# Patient Record
Sex: FEMALE | Race: OTHER
[De-identification: names, ages, dates, MRNs, and addresses within clinical notes are randomized per-mention and may not be internally consistent; named-entity substitution may affect disease eponyms.]

---

## 2020-02-04 ENCOUNTER — HOSPITAL ENCOUNTER (INPATIENT)
Dept: HOSPITAL 54 - ER | Age: 85
LOS: 3 days | Discharge: NURSING, REHAB OR LONG-TERM CARE | DRG: 480 | End: 2020-02-07
Attending: NURSE PRACTITIONER | Admitting: NURSE PRACTITIONER
Payer: MEDICARE

## 2020-02-04 VITALS — WEIGHT: 136.25 LBS | BODY MASS INDEX: 25.72 KG/M2 | HEIGHT: 61 IN

## 2020-02-04 VITALS — DIASTOLIC BLOOD PRESSURE: 59 MMHG | SYSTOLIC BLOOD PRESSURE: 121 MMHG

## 2020-02-04 VITALS — DIASTOLIC BLOOD PRESSURE: 49 MMHG | SYSTOLIC BLOOD PRESSURE: 107 MMHG
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
## 2020-02-04 DIAGNOSIS — K21.9: ICD-10-CM

## 2020-02-04 DIAGNOSIS — M80.052A: Primary | ICD-10-CM

## 2020-02-04 DIAGNOSIS — E11.65: ICD-10-CM

## 2020-02-04 DIAGNOSIS — N18.9: ICD-10-CM

## 2020-02-04 DIAGNOSIS — E83.42: ICD-10-CM

## 2020-02-04 DIAGNOSIS — Z79.4: ICD-10-CM

## 2020-02-04 DIAGNOSIS — M10.9: ICD-10-CM

## 2020-02-04 DIAGNOSIS — E87.0: ICD-10-CM
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
## 2020-02-04 DIAGNOSIS — N17.0: ICD-10-CM

## 2020-02-04 DIAGNOSIS — E11.22: ICD-10-CM

## 2020-02-04 DIAGNOSIS — F03.90: ICD-10-CM

## 2020-02-04 DIAGNOSIS — I12.9: ICD-10-CM

## 2020-02-04 DIAGNOSIS — N39.0: ICD-10-CM

## 2020-02-04 DIAGNOSIS — B96.20: ICD-10-CM
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
## 2020-02-04 DIAGNOSIS — Z16.12: ICD-10-CM

## 2020-02-04 DIAGNOSIS — E03.9: ICD-10-CM

## 2020-02-04 LAB
ALBUMIN SERPL BCP-MCNC: 3 G/DL (ref 3.4–5)
ALP SERPL-CCNC: 144 U/L (ref 46–116)
ALT SERPL W P-5'-P-CCNC: 17 U/L (ref 12–78)
AST SERPL W P-5'-P-CCNC: 16 U/L (ref 15–37)
BASOPHILS # BLD AUTO: 0.1 /CMM (ref 0–0.2)
BASOPHILS NFR BLD AUTO: 0.7 % (ref 0–2)
BILIRUB DIRECT SERPL-MCNC: 0.1 MG/DL (ref 0–0.2)
BILIRUB SERPL-MCNC: 0.3 MG/DL (ref 0.2–1)
BUN SERPL-MCNC: 61 MG/DL (ref 7–18)
CALCIUM SERPL-MCNC: 9 MG/DL (ref 8.5–10.1)
CHLORIDE SERPL-SCNC: 111 MMOL/L (ref 98–107)
CO2 SERPL-SCNC: 27 MMOL/L (ref 21–32)
CREAT SERPL-MCNC: 2.8 MG/DL (ref 0.6–1.3)
EOSINOPHIL NFR BLD AUTO: 0.9 % (ref 0–6)
GLUCOSE SERPL-MCNC: 133 MG/DL (ref 74–106)
HCT VFR BLD AUTO: 37 % (ref 33–45)
HGB BLD-MCNC: 12.2 G/DL (ref 11.5–14.8)
LYMPHOCYTES NFR BLD AUTO: 1.6 /CMM (ref 0.8–4.8)
LYMPHOCYTES NFR BLD AUTO: 21.8 % (ref 20–44)
LYMPHOCYTES NFR BLD MANUAL: 19 % (ref 16–48)
MCHC RBC AUTO-ENTMCNC: 33 G/DL (ref 31–36)
MCV RBC AUTO: 93 FL (ref 82–100)
MONOCYTES NFR BLD AUTO: 0.7 /CMM (ref 0.1–1.3)
MONOCYTES NFR BLD AUTO: 8.7 % (ref 2–12)
MONOCYTES NFR BLD MANUAL: 6 % (ref 0–11)
NEUTROPHILS # BLD AUTO: 5.1 /CMM (ref 1.8–8.9)
NEUTROPHILS NFR BLD AUTO: 67.9 % (ref 43–81)
NEUTS SEG NFR BLD MANUAL: 75 % (ref 42–76)
PLATELET # BLD AUTO: 195 /CMM (ref 150–450)
POTASSIUM SERPL-SCNC: 4.1 MMOL/L (ref 3.5–5.1)
PROT SERPL-MCNC: 6.9 G/DL (ref 6.4–8.2)
RBC # BLD AUTO: 4.04 MIL/UL (ref 4–5.2)
SODIUM SERPL-SCNC: 146 MMOL/L (ref 136–145)
WBC NRBC COR # BLD AUTO: 7.5 K/UL (ref 4.3–11)

## 2020-02-04 PROCEDURE — A4216 STERILE WATER/SALINE, 10 ML: HCPCS

## 2020-02-04 PROCEDURE — A6253 ABSORPT DRG > 48 SQ IN W/O B: HCPCS

## 2020-02-04 PROCEDURE — C1713 ANCHOR/SCREW BN/BN,TIS/BN: HCPCS

## 2020-02-04 PROCEDURE — G0378 HOSPITAL OBSERVATION PER HR: HCPCS

## 2020-02-04 PROCEDURE — C9113 INJ PANTOPRAZOLE SODIUM, VIA: HCPCS

## 2020-02-04 PROCEDURE — A6209 FOAM DRSG <=16 SQ IN W/O BDR: HCPCS

## 2020-02-04 RX ADMIN — INSULIN GLARGINE SCH UNIT: 100 INJECTION, SOLUTION SUBCUTANEOUS at 21:44

## 2020-02-04 RX ADMIN — Medication PRN MG: at 15:54

## 2020-02-04 RX ADMIN — MEMANTINE HYDROCHLORIDE SCH MG: 5 TABLET ORAL at 18:14

## 2020-02-04 RX ADMIN — DONEPEZIL HYDROCHLORIDE SCH MG: 5 TABLET ORAL at 21:41

## 2020-02-04 RX ADMIN — Medication SCH MG: at 18:14

## 2020-02-04 RX ADMIN — SODIUM CHLORIDE PRN MLS/HR: 4.5 INJECTION, SOLUTION INTRAVENOUS at 15:48

## 2020-02-04 RX ADMIN — Medication SCH EACH: at 21:44

## 2020-02-04 RX ADMIN — INSULIN LISPRO SCH UNIT: 100 INJECTION, SOLUTION INTRAVENOUS; SUBCUTANEOUS at 18:16

## 2020-02-04 RX ADMIN — Medication PRN MG: at 23:56

## 2020-02-04 RX ADMIN — PREGABALIN SCH MG: 25 CAPSULE ORAL at 18:14

## 2020-02-04 RX ADMIN — Medication SCH EACH: at 18:14

## 2020-02-04 NOTE — NUR
RN NOTES

 BS-169MG/DL COVERAGE GIVEN, ADMINISTERED SCHEDULED MEDICATION. MEDICATION WERE ADMINISTERED 
FOR PAIN EFFECTIVE, PATIENTS DAUGHTER SIGN CONSENT FOR FOR SURGERY WHICH IS SCHEDULED 
TOMORROW 2/5/ 20, 1750 PM. V/S STABLE . PATIENT TOLERATED DINNER 30% WITH MINIMAL ASSIST. 
INFUSING 1/2 NS AT 75 ML/HR ON RIGHT AC ARE. CALL LIGHT WITHIN TO REACH, FAMILY NEXT TO THE 
BED. ENDORSED ONCOMING NURSE FOLLOW PLAN OF CARE.

## 2020-02-04 NOTE — NUR
RN OPEN NOTES



RECEIVED PATIENT AWAKE LYING IN BED. A/OX2. NO SIGNS OF DISTRESS OR DISCOMFORT. BREATHING 
EVEN AND UNLABORED. IV ACCESS IN RAC WITH 1/2 NS INFUSING, PATENT AND INTACT, NO SIGNS OF 
REDNESS OR INFILTRATION. BED IN LOW LOCKED POSITION WITH SIDE RAILS X2. CALL LIGHT WITHIN 
REACH. WILL CONTINUE TO MONITOR.

## 2020-02-04 NOTE — NUR
RN ADMISSION NOTES

 PATIENT WAS ADMITTED FROM ER 88 Y/OLD FEMALE  Macedonian SPEAKER ON Dx OF LEFT HIP Fx. PATIENT 
A/O X2, FORGETFUL, BUT REDIRECTABLE. PATIENT WAS COMPLAINING OF LEFT HIP PAIN 8/10 PER PAIN 
SCALE. NO ACUTE RESPIRATORY DISTRESS, BREATHING UNLABORED. V/S TAKEN /59, P-66, R-19, 
02-95 ROOM AIR, T-97.8.IV ACCESS ON RIGHT AC AREA INTACT, SKIN ASSESSMENT DONE CLEAR , HAS 
LEFT LEG  TOES AMPUTATION, AND RIGHT BIG TOE AMPUTATION. PATIENT INCONTINENT USING DIAPER, 
NEEDS ATTENDED  AND ANTICIPATED.  HOSPITALIST BETTY ABURTO AWARE OF NEW PATIENT AND 
MEDICATION, CALL LIGHT WITHIN TO REACH. CONTINUED MONITORING.

## 2020-02-04 NOTE — NUR
patient bibra from home, c/o L hip pain s/p fall from bed yesterday. On room 
air, breathing evenly and unlabored, connected to the monitor and pulse ox. 
kept comfortable, will continue to monitor accordingly.

## 2020-02-04 NOTE — NUR
rn notes

 administered morphine sulfate 2 mg/ml iv push for left hip pain 8/10 per patient request. 
v/s taken bp 121/59, p-66, r-19. call light within to reach, safety precaution maintained 
all the time.

## 2020-02-05 VITALS — DIASTOLIC BLOOD PRESSURE: 50 MMHG | SYSTOLIC BLOOD PRESSURE: 115 MMHG

## 2020-02-05 VITALS — SYSTOLIC BLOOD PRESSURE: 120 MMHG | DIASTOLIC BLOOD PRESSURE: 58 MMHG

## 2020-02-05 VITALS — DIASTOLIC BLOOD PRESSURE: 58 MMHG | SYSTOLIC BLOOD PRESSURE: 111 MMHG

## 2020-02-05 LAB
ALBUMIN SERPL BCP-MCNC: 2.6 G/DL (ref 3.4–5)
ALP SERPL-CCNC: 166 U/L (ref 46–116)
ALT SERPL W P-5'-P-CCNC: 37 U/L (ref 12–78)
APPEARANCE UR: (no result)
AST SERPL W P-5'-P-CCNC: 64 U/L (ref 15–37)
BASOPHILS # BLD AUTO: 0 /CMM (ref 0–0.2)
BASOPHILS NFR BLD AUTO: 0.7 % (ref 0–2)
BILIRUB SERPL-MCNC: 0.4 MG/DL (ref 0.2–1)
BILIRUB UR QL STRIP: NEGATIVE
BUN SERPL-MCNC: 58 MG/DL (ref 7–18)
CALCIUM SERPL-MCNC: 8.2 MG/DL (ref 8.5–10.1)
CHLORIDE SERPL-SCNC: 106 MMOL/L (ref 98–107)
CHOLEST SERPL-MCNC: 131 MG/DL (ref ?–200)
CHOLEST SERPL-MCNC: 132 MG/DL (ref ?–200)
CO2 SERPL-SCNC: 24 MMOL/L (ref 21–32)
COLOR UR: YELLOW
CREAT SERPL-MCNC: 2.6 MG/DL (ref 0.6–1.3)
CREAT UR-MCNC: 57.5 MG/DL (ref 30–125)
EOSINOPHIL NFR BLD AUTO: 1.4 % (ref 0–6)
FERRITIN SERPL-MCNC: 283 NG/ML (ref 8–388)
GLUCOSE SERPL-MCNC: 102 MG/DL (ref 74–106)
GLUCOSE UR STRIP-MCNC: NEGATIVE MG/DL
HCT VFR BLD AUTO: 33 % (ref 33–45)
HCT VFR BLD AUTO: 35 % (ref 33–45)
HDLC SERPL-MCNC: 58 MG/DL (ref 40–60)
HDLC SERPL-MCNC: 59 MG/DL (ref 40–60)
HGB BLD-MCNC: 10.8 G/DL (ref 11.5–14.8)
HGB BLD-MCNC: 11.2 G/DL (ref 11.5–14.8)
HGB UR QL STRIP: (no result) ERY/UL
IRON SERPL-MCNC: 31 UG/DL (ref 50–175)
KETONES UR STRIP-MCNC: NEGATIVE MG/DL
LDLC SERPL DIRECT ASSAY-MCNC: 54 MG/DL (ref 0–99)
LDLC SERPL DIRECT ASSAY-MCNC: 55 MG/DL (ref 0–99)
LEUKOCYTE ESTERASE UR QL STRIP: (no result)
LYMPHOCYTES NFR BLD AUTO: 1.6 /CMM (ref 0.8–4.8)
LYMPHOCYTES NFR BLD AUTO: 24.5 % (ref 20–44)
MAGNESIUM SERPL-MCNC: 1.4 MG/DL (ref 1.8–2.4)
MCHC RBC AUTO-ENTMCNC: 32 G/DL (ref 31–36)
MCV RBC AUTO: 92 FL (ref 82–100)
MONOCYTES NFR BLD AUTO: 0.6 /CMM (ref 0.1–1.3)
MONOCYTES NFR BLD AUTO: 9.3 % (ref 2–12)
NEUTROPHILS # BLD AUTO: 4.2 /CMM (ref 1.8–8.9)
NEUTROPHILS NFR BLD AUTO: 64.1 % (ref 43–81)
NITRITE UR QL STRIP: POSITIVE
PH UR STRIP: 6 [PH] (ref 5–8)
PHOSPHATE SERPL-MCNC: 4.1 MG/DL (ref 2.5–4.9)
PLATELET # BLD AUTO: 176 /CMM (ref 150–450)
POTASSIUM SERPL-SCNC: 4 MMOL/L (ref 3.5–5.1)
PROT SERPL-MCNC: 6.1 G/DL (ref 6.4–8.2)
PROT UR QL STRIP: NEGATIVE MG/DL
PROT UR-MCNC: 16.8 MG/DL (ref 0–11.9)
RBC # BLD AUTO: 3.77 MIL/UL (ref 4–5.2)
RBC #/AREA URNS HPF: (no result) /HPF (ref 0–2)
SODIUM SERPL-SCNC: 139 MMOL/L (ref 136–145)
SODIUM UR-SCNC: 51 MMOL/L (ref 40–220)
TIBC SERPL-MCNC: 220 UG/DL (ref 250–450)
TRIGL SERPL-MCNC: 90 MG/DL (ref 30–150)
TRIGL SERPL-MCNC: 95 MG/DL (ref 30–150)
TSH SERPL DL<=0.005 MIU/L-ACNC: 0.41 UIU/ML (ref 0.36–3.74)
UROBILINOGEN UR STRIP-MCNC: 0.2 EU/DL
WBC #/AREA URNS HPF: (no result) /HPF
WBC #/AREA URNS HPF: (no result) /HPF (ref 0–3)
WBC NRBC COR # BLD AUTO: 6.6 K/UL (ref 4.3–11)

## 2020-02-05 PROCEDURE — 0QS706Z REPOSITION LEFT UPPER FEMUR WITH INTRAMEDULLARY INTERNAL FIXATION DEVICE, OPEN APPROACH: ICD-10-PCS | Performed by: SPECIALIST

## 2020-02-05 RX ADMIN — INSULIN LISPRO SCH UNIT: 100 INJECTION, SOLUTION INTRAVENOUS; SUBCUTANEOUS at 17:30

## 2020-02-05 RX ADMIN — Medication PRN MG: at 10:33

## 2020-02-05 RX ADMIN — Medication SCH EACH: at 17:39

## 2020-02-05 RX ADMIN — Medication SCH MG: at 17:00

## 2020-02-05 RX ADMIN — SODIUM CHLORIDE SCH MG: 9 INJECTION, SOLUTION INTRAVENOUS at 08:44

## 2020-02-05 RX ADMIN — SODIUM CHLORIDE PRN MLS/HR: 4.5 INJECTION, SOLUTION INTRAVENOUS at 05:31

## 2020-02-05 RX ADMIN — INSULIN LISPRO SCH UNIT: 100 INJECTION, SOLUTION INTRAVENOUS; SUBCUTANEOUS at 07:30

## 2020-02-05 RX ADMIN — ALLOPURINOL SCH MG: 100 TABLET ORAL at 08:42

## 2020-02-05 RX ADMIN — MAGNESIUM SULFATE IN DEXTROSE SCH MLS/HR: 10 INJECTION, SOLUTION INTRAVENOUS at 10:33

## 2020-02-05 RX ADMIN — Medication SCH EACH: at 06:17

## 2020-02-05 RX ADMIN — MUPIROCIN SCH GM: 20 OINTMENT TOPICAL at 21:02

## 2020-02-05 RX ADMIN — Medication PRN MG: at 06:16

## 2020-02-05 RX ADMIN — MEMANTINE HYDROCHLORIDE SCH MG: 5 TABLET ORAL at 17:00

## 2020-02-05 RX ADMIN — Medication PRN MG: at 20:31

## 2020-02-05 RX ADMIN — AMLODIPINE BESYLATE SCH MG: 5 TABLET ORAL at 08:42

## 2020-02-05 RX ADMIN — DONEPEZIL HYDROCHLORIDE SCH MG: 5 TABLET ORAL at 21:03

## 2020-02-05 RX ADMIN — MEMANTINE HYDROCHLORIDE SCH MG: 5 TABLET ORAL at 08:41

## 2020-02-05 RX ADMIN — LEVOTHYROXINE SODIUM SCH MCG: 100 TABLET ORAL at 08:42

## 2020-02-05 RX ADMIN — MAGNESIUM SULFATE IN DEXTROSE SCH MLS/HR: 10 INJECTION, SOLUTION INTRAVENOUS at 11:35

## 2020-02-05 RX ADMIN — INSULIN LISPRO SCH UNIT: 100 INJECTION, SOLUTION INTRAVENOUS; SUBCUTANEOUS at 11:55

## 2020-02-05 RX ADMIN — PREGABALIN SCH MG: 25 CAPSULE ORAL at 08:41

## 2020-02-05 RX ADMIN — PREGABALIN SCH MG: 25 CAPSULE ORAL at 17:00

## 2020-02-05 RX ADMIN — FERROUS SULFATE TAB 325 MG (65 MG ELEMENTAL FE) SCH MG: 325 (65 FE) TAB at 08:42

## 2020-02-05 RX ADMIN — Medication SCH MG: at 08:42

## 2020-02-05 RX ADMIN — INSULIN GLARGINE SCH UNIT: 100 INJECTION, SOLUTION SUBCUTANEOUS at 21:01

## 2020-02-05 RX ADMIN — Medication SCH EACH: at 21:03

## 2020-02-05 RX ADMIN — Medication SCH EACH: at 11:46

## 2020-02-05 NOTE — NUR
MS RN OPENING NOTES





RECEIVED PT IN BED, AWAKE, A/O 2. PT TOLERATING RA, WITH NO ACUTE RESPIRATORY DISTRESS. PT 
DENIES ANY PAIN OR DISCOMFORT AT THIS TIME. ALSO DENIES ANY CONCERN OR QUESTIONS AT THE 
MOMENT. IVF 1/2 NS AT 75ML/HR CHANGED TO 200ML/HR PER DR MACHADO X2 BAGS TO RAC G20, INTACT 
AND FLUID INFUSING WELL. PT ON NPO EXCEPT MEDS AT THIS TIME, IN PREPARATION FOR 5PM SURGERY 
WITH DR. PORTER.  PT KEPT COMFORTABLE IN BED. CALL LIGHT KEPT WITHIN REACH. PT'S BED IN 
LOWEST, LOCKED POSITION WITH SR X3. WILL CONTINUE PLAN OF CARE.

## 2020-02-05 NOTE — NUR
MS RN NOTES



RECEIVED CALL FROM PHARMACY REGARDING MAGNESIUM LVL THAT THEY CANT REPLACE PER CREA OF 2.6, 
AGAINST THEIR [PROTOCOL. HOSPITALIST/TS MADE AWARE AND ORDERED 2GMS OF MG VIA IV. ORDER 
PLACED. PT MADE AWARE. WILL CONTINUE TO MONITOR.

## 2020-02-05 NOTE — NUR
RN CLOSING NOTES





PATIENT RESTING COMFORTABLY IN BED, EASILY AROUSABLE. A/OX2. NO SIGNS OF DISTRESS OR 
DISCOMFORT. BREATHING EVEN AND UNLABORED. IV ACCESS IN RAC WITH 1/2 NS INFUSING, PATENT AND 
INTACT, NO SIGNS OF REDNESS OR INFILTRATION. ALL NEEDS MET. NO SIGNIFICANT CHANGES THROUGH 
THE NIGHT. PATIENT KEPT CLEAN DRY AND COMFORTABLE. BED IN LOW LOCKED POSITION WITH SIDE 
RAILS X2. CALL LIGHT WITHIN REACH. WILL ENDORSE TO AM SHIFT FOR AFSHAN.

## 2020-02-06 VITALS — DIASTOLIC BLOOD PRESSURE: 57 MMHG | SYSTOLIC BLOOD PRESSURE: 105 MMHG

## 2020-02-06 VITALS — SYSTOLIC BLOOD PRESSURE: 117 MMHG | DIASTOLIC BLOOD PRESSURE: 45 MMHG

## 2020-02-06 VITALS — SYSTOLIC BLOOD PRESSURE: 115 MMHG | DIASTOLIC BLOOD PRESSURE: 55 MMHG

## 2020-02-06 LAB
ALBUMIN SERPL BCP-MCNC: 2.7 G/DL (ref 3.4–5)
ALP SERPL-CCNC: 212 U/L (ref 46–116)
ALT SERPL W P-5'-P-CCNC: 53 U/L (ref 12–78)
AST SERPL W P-5'-P-CCNC: 54 U/L (ref 15–37)
BASOPHILS # BLD AUTO: 0 /CMM (ref 0–0.2)
BASOPHILS NFR BLD AUTO: 0 % (ref 0–2)
BILIRUB SERPL-MCNC: 0.3 MG/DL (ref 0.2–1)
BUN SERPL-MCNC: 51 MG/DL (ref 7–18)
CALCIUM SERPL-MCNC: 8.6 MG/DL (ref 8.5–10.1)
CHLORIDE SERPL-SCNC: 99 MMOL/L (ref 98–107)
CK SERPL-CCNC: 58 U/L (ref 26–192)
CO2 SERPL-SCNC: 21 MMOL/L (ref 21–32)
CREAT SERPL-MCNC: 2.4 MG/DL (ref 0.6–1.3)
EOSINOPHIL NFR BLD AUTO: 0 % (ref 0–6)
GLUCOSE SERPL-MCNC: 409 MG/DL (ref 74–106)
HCT VFR BLD AUTO: 35 % (ref 33–45)
HGB BLD-MCNC: 11 G/DL (ref 11.5–14.8)
LYMPHOCYTES NFR BLD AUTO: 0.2 /CMM (ref 0.8–4.8)
LYMPHOCYTES NFR BLD AUTO: 2.2 % (ref 20–44)
MAGNESIUM SERPL-MCNC: 2 MG/DL (ref 1.8–2.4)
MCHC RBC AUTO-ENTMCNC: 32 G/DL (ref 31–36)
MCV RBC AUTO: 93 FL (ref 82–100)
MONOCYTES NFR BLD AUTO: 0.3 /CMM (ref 0.1–1.3)
MONOCYTES NFR BLD AUTO: 3.4 % (ref 2–12)
NEUTROPHILS # BLD AUTO: 8.8 /CMM (ref 1.8–8.9)
NEUTROPHILS NFR BLD AUTO: 94.4 % (ref 43–81)
PHOSPHATE SERPL-MCNC: 4.7 MG/DL (ref 2.5–4.9)
PLATELET # BLD AUTO: 164 /CMM (ref 150–450)
POTASSIUM SERPL-SCNC: 4.7 MMOL/L (ref 3.5–5.1)
PROT SERPL-MCNC: 6.6 G/DL (ref 6.4–8.2)
RBC # BLD AUTO: 3.73 MIL/UL (ref 4–5.2)
SODIUM SERPL-SCNC: 132 MMOL/L (ref 136–145)
WBC NRBC COR # BLD AUTO: 9.4 K/UL (ref 4.3–11)

## 2020-02-06 RX ADMIN — INSULIN LISPRO SCH UNIT: 100 INJECTION, SOLUTION INTRAVENOUS; SUBCUTANEOUS at 07:30

## 2020-02-06 RX ADMIN — AMLODIPINE BESYLATE SCH MG: 5 TABLET ORAL at 08:41

## 2020-02-06 RX ADMIN — MUPIROCIN SCH APPLIC: 20 OINTMENT TOPICAL at 21:36

## 2020-02-06 RX ADMIN — PREGABALIN SCH MG: 25 CAPSULE ORAL at 16:15

## 2020-02-06 RX ADMIN — MEMANTINE HYDROCHLORIDE SCH MG: 5 TABLET ORAL at 16:15

## 2020-02-06 RX ADMIN — INSULIN HUMAN PRN UNIT: 100 INJECTION, SOLUTION PARENTERAL at 08:02

## 2020-02-06 RX ADMIN — Medication SCH EACH: at 12:32

## 2020-02-06 RX ADMIN — Medication PRN EACH: at 02:53

## 2020-02-06 RX ADMIN — DEXTROSE MONOHYDRATE SCH MLS/HR: 50 INJECTION, SOLUTION INTRAVENOUS at 01:15

## 2020-02-06 RX ADMIN — INSULIN LISPRO SCH UNIT: 100 INJECTION, SOLUTION INTRAVENOUS; SUBCUTANEOUS at 13:15

## 2020-02-06 RX ADMIN — SODIUM CHLORIDE SCH MG: 9 INJECTION, SOLUTION INTRAVENOUS at 08:15

## 2020-02-06 RX ADMIN — Medication SCH EACH: at 05:50

## 2020-02-06 RX ADMIN — Medication SCH MG: at 15:32

## 2020-02-06 RX ADMIN — MUPIROCIN SCH APPLIC: 20 OINTMENT TOPICAL at 08:41

## 2020-02-06 RX ADMIN — INSULIN GLARGINE SCH UNIT: 100 INJECTION, SOLUTION SUBCUTANEOUS at 21:49

## 2020-02-06 RX ADMIN — ALLOPURINOL SCH MG: 100 TABLET ORAL at 08:16

## 2020-02-06 RX ADMIN — Medication SCH EACH: at 21:35

## 2020-02-06 RX ADMIN — Medication SCH MG: at 21:35

## 2020-02-06 RX ADMIN — MEMANTINE HYDROCHLORIDE SCH MG: 5 TABLET ORAL at 08:16

## 2020-02-06 RX ADMIN — Medication SCH MG: at 16:18

## 2020-02-06 RX ADMIN — DEXTROSE MONOHYDRATE SCH MLS/HR: 50 INJECTION, SOLUTION INTRAVENOUS at 01:18

## 2020-02-06 RX ADMIN — Medication SCH MG: at 08:16

## 2020-02-06 RX ADMIN — Medication SCH EACH: at 18:04

## 2020-02-06 RX ADMIN — PREGABALIN SCH MG: 25 CAPSULE ORAL at 08:16

## 2020-02-06 RX ADMIN — DONEPEZIL HYDROCHLORIDE SCH MG: 5 TABLET ORAL at 21:35

## 2020-02-06 RX ADMIN — LEVOTHYROXINE SODIUM SCH MCG: 100 TABLET ORAL at 08:16

## 2020-02-06 RX ADMIN — Medication PRN EACH: at 13:35

## 2020-02-06 RX ADMIN — FERROUS SULFATE TAB 325 MG (65 MG ELEMENTAL FE) SCH MG: 325 (65 FE) TAB at 08:16

## 2020-02-06 RX ADMIN — INSULIN LISPRO SCH UNIT: 100 INJECTION, SOLUTION INTRAVENOUS; SUBCUTANEOUS at 18:15

## 2020-02-06 NOTE — NUR
RN OPENING NOTES



RECEIVED PATIENT IN STABLE CONDITION. AWAKE AND RESPONSIVE TO VERBAL AND TACTILE STIMULI. 
A/OX2, French SPEAKING. NOT IN ANY FORM OF DISTRESS. NO SOB. DENIED PAIN OR DISCOMFORT AT 
THIS TIME. IV ACCESS INTACT AND PATENT. MAYER IN PLACE DRAINING CLEAR YELLOW URINE. KEPT 
PATIENT SAFE AND COMFORTABLE. SCD PUMPS IN PLACE. BED IN LOW/LOCKED POSITION, SIDERAILS 
UPX2, CALL LIGHT IN REACH. WILL CONT TO MONIOTR ACCORDINGLY.

## 2020-02-06 NOTE — NUR
RN CLOSING NOTES



PATIENT IN STABLE CONDITION. ALL NEEDS ATTENDED AND PROVIDED. ALL DUE MEDS GIVEN AS ORDERED. 
TURNED AND REPOSITIONED PATIENT EVERY 2HRS AND AS NEEDED. KEPT PATIENT SAFE AND COMFORTABLE. 
BED IN LOW,LOCKED POSITION. SIDERAILS UPX2, SEMIFOWLER POSITION. CALL LIGHT IN REACH. 
ENDORSED ACCORDINGLY

## 2020-02-06 NOTE — NUR
RN NOTES



HS ACCUCHECK 179. ONLY ADMINISTERED LANTUS 8UN VIA LT DELTOID. PER FAMILY REQUEST, SON 
STATED THAT Pt's BLOOD SUGAR TENDS TO DROP LOW IN THE MORNING. SO DID NOT GIVE ADDITIONAL 
INSULIN COVERAGE AT THIS TIME. WILL REASSESS BG IN THE AM.

## 2020-02-06 NOTE — NUR
MS RN NOTES



AWAKE & RESPONSIVE. NOT IN ANY DISTRESS. NO SOB NOTED. DENIES ANY PAIN OR DISCOMFORT AT THIS 
TIME. WITH IVF INFUSING WELL. WITH F/C DRAINING TO YELLOWISH OUTPUT MODERATE IN AMOUNT. 
DRESSING @ L HIP C/D/I. AM CARE DONE. MONITORED ACCORDINGY. CALL LIGHT WITHIN REACH. BED IN 
LOWEST POSITION. SR UP X 3 WITH BED ALARM ON FOR SAFETY. WILL ENDORSE TO NEXT SHIFT.

## 2020-02-06 NOTE — NUR
RN OPENING NOTES



RECEIVED REPORT FROM MEG DINH. FOUND Pt AWAKE, RESTING IN BED, FAMILY VISITING AT 
BEDSIDE. NO S/S OF ACUTE DISTRESS OR SOB NOTED. 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 
SPEAKING ONLY. MAYER CATHETER IN PLACE, WILL REMOVE LATER TONIGHT PER ORDER TO REMOVE MAYER 
POST OP DAY 1. IV ACCESS ON RAC #20G. Pt IS ON ISO FOR MRSA NARES. PER REPORT Pt's TEMP WAS 
RUNNING LOW, SO BEAR HUGGER WAS PLACED ON Pt TO HELP INCREASE HER BODY TEMP. PER Pt 
STATEMENT, Pt DOES NOT FEEL COLD. CURRENT TEMP IS 97.5F. WILL CONTINUE TO MONITOR Pt's 
CONDITION AND SAFETY THROUGHOUT THE NIGHT.

## 2020-02-06 NOTE — NUR
rn notes



called Leonard, pharmacist, regarding insulin scheduled at 0730, informed Leonard that 10 units 
regular insulin ACHS was given. per Leonard, hold the lispro for now, and verify with MD if 
both "0730 scheduled insulin" or "ACHS" in AM is ok to continue or only one of the two is 
needed.

  

Scheduled 5 units lispro held. regular 10 units insulin given already for bs 409. will 
notify MD

## 2020-02-07 ENCOUNTER — HOSPITAL ENCOUNTER (INPATIENT)
Dept: HOSPITAL 12 - REHAB | Age: 85
LOS: 18 days | Discharge: HOME HEALTH | DRG: 559 | End: 2020-02-25
Attending: PHYSICAL MEDICINE & REHABILITATION | Admitting: PHYSICAL MEDICINE & REHABILITATION
Payer: MEDICARE

## 2020-02-07 VITALS — BODY MASS INDEX: 25.52 KG/M2 | WEIGHT: 130 LBS | HEIGHT: 60 IN

## 2020-02-07 VITALS — DIASTOLIC BLOOD PRESSURE: 42 MMHG | SYSTOLIC BLOOD PRESSURE: 119 MMHG

## 2020-02-07 VITALS — DIASTOLIC BLOOD PRESSURE: 61 MMHG | SYSTOLIC BLOOD PRESSURE: 125 MMHG

## 2020-02-07 VITALS — SYSTOLIC BLOOD PRESSURE: 133 MMHG | DIASTOLIC BLOOD PRESSURE: 54 MMHG

## 2020-02-07 VITALS — DIASTOLIC BLOOD PRESSURE: 62 MMHG | SYSTOLIC BLOOD PRESSURE: 119 MMHG

## 2020-02-07 DIAGNOSIS — G62.9: ICD-10-CM

## 2020-02-07 DIAGNOSIS — D62: ICD-10-CM

## 2020-02-07 DIAGNOSIS — D68.59: ICD-10-CM

## 2020-02-07 DIAGNOSIS — Z79.4: ICD-10-CM

## 2020-02-07 DIAGNOSIS — F03.90: ICD-10-CM

## 2020-02-07 DIAGNOSIS — M10.9: ICD-10-CM

## 2020-02-07 DIAGNOSIS — N39.0: ICD-10-CM

## 2020-02-07 DIAGNOSIS — N18.9: ICD-10-CM

## 2020-02-07 DIAGNOSIS — I50.33: ICD-10-CM

## 2020-02-07 DIAGNOSIS — M19.90: ICD-10-CM
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
## 2020-02-07 DIAGNOSIS — I13.0: ICD-10-CM

## 2020-02-07 DIAGNOSIS — E11.65: ICD-10-CM

## 2020-02-07 DIAGNOSIS — D63.1: ICD-10-CM

## 2020-02-07 DIAGNOSIS — N17.0: ICD-10-CM

## 2020-02-07 DIAGNOSIS — E03.9: ICD-10-CM

## 2020-02-07 DIAGNOSIS — I27.20: ICD-10-CM

## 2020-02-07 DIAGNOSIS — E11.22: ICD-10-CM

## 2020-02-07 DIAGNOSIS — Z16.12: ICD-10-CM

## 2020-02-07 DIAGNOSIS — I35.8: ICD-10-CM

## 2020-02-07 DIAGNOSIS — R53.83: ICD-10-CM

## 2020-02-07 DIAGNOSIS — M81.0: ICD-10-CM

## 2020-02-07 DIAGNOSIS — G93.41: ICD-10-CM

## 2020-02-07 DIAGNOSIS — K21.9: ICD-10-CM

## 2020-02-07 DIAGNOSIS — S72.142D: Primary | ICD-10-CM

## 2020-02-07 LAB
ALBUMIN SERPL ELPH-MCNC: 3 G/DL (ref 2.9–4.4)
ALBUMIN/GLOB SERPL: 0.9 {RATIO} (ref 0.7–1.7)
ALPHA1 GLOB SERPL ELPH-MCNC: 0.4 G/DL (ref 0–0.4)
ALPHA2 GLOB SERPL ELPH-MCNC: 0.9 G/DL (ref 0.4–1)
B-GLOBULIN SERPL ELPH-MCNC: 1 G/DL (ref 0.7–1.3)
BASOPHILS # BLD AUTO: 0 /CMM (ref 0–0.2)
BASOPHILS NFR BLD AUTO: 0.3 % (ref 0–2)
BUN SERPL-MCNC: 52 MG/DL (ref 7–18)
CALCIUM SERPL-MCNC: 8.6 MG/DL (ref 8.5–10.1)
CHLORIDE SERPL-SCNC: 99 MMOL/L (ref 98–107)
CO2 SERPL-SCNC: 22 MMOL/L (ref 21–32)
CREAT SERPL-MCNC: 2.4 MG/DL (ref 0.6–1.3)
EOSINOPHIL NFR BLD AUTO: 0.3 % (ref 0–6)
GAMMA GLOB SERPL ELPH-MCNC: 1.1 G/DL (ref 0.4–1.8)
GLOBULIN SER CALC-MCNC: 3.4 G/DL (ref 2.2–3.9)
GLUCOSE SERPL-MCNC: 150 MG/DL (ref 74–106)
HCT VFR BLD AUTO: 29 % (ref 33–45)
HGB BLD-MCNC: 9.4 G/DL (ref 11.5–14.8)
LYMPHOCYTES NFR BLD AUTO: 0.9 /CMM (ref 0.8–4.8)
LYMPHOCYTES NFR BLD AUTO: 9.6 % (ref 20–44)
MAGNESIUM SERPL-MCNC: 2 MG/DL (ref 1.8–2.4)
MCHC RBC AUTO-ENTMCNC: 33 G/DL (ref 31–36)
MCV RBC AUTO: 92 FL (ref 82–100)
MONOCYTES NFR BLD AUTO: 0.8 /CMM (ref 0.1–1.3)
MONOCYTES NFR BLD AUTO: 8 % (ref 2–12)
NEUTROPHILS # BLD AUTO: 7.9 /CMM (ref 1.8–8.9)
NEUTROPHILS NFR BLD AUTO: 81.8 % (ref 43–81)
PHOSPHATE SERPL-MCNC: 3.7 MG/DL (ref 2.5–4.9)
PLATELET # BLD AUTO: 139 /CMM (ref 150–450)
POTASSIUM SERPL-SCNC: 4.7 MMOL/L (ref 3.5–5.1)
PTH-INTACT SERPL-MCNC: 117 PG/ML (ref 15–65)
RBC # BLD AUTO: 3.14 MIL/UL (ref 4–5.2)
SODIUM SERPL-SCNC: 131 MMOL/L (ref 136–145)
WBC NRBC COR # BLD AUTO: 9.6 K/UL (ref 4.3–11)

## 2020-02-07 PROCEDURE — C1758 CATHETER, URETERAL: HCPCS

## 2020-02-07 PROCEDURE — A9150 MISC/EXPER NON-PRESCRIPT DRU: HCPCS

## 2020-02-07 RX ADMIN — Medication PRN EACH: at 16:04

## 2020-02-07 RX ADMIN — MEMANTINE HYDROCHLORIDE SCH MG: 5 TABLET ORAL at 16:04

## 2020-02-07 RX ADMIN — Medication SCH EACH: at 12:19

## 2020-02-07 RX ADMIN — SODIUM CHLORIDE PRN UNIT: 9 INJECTION, SOLUTION INTRAVENOUS at 21:42

## 2020-02-07 RX ADMIN — INSULIN LISPRO SCH UNIT: 100 INJECTION, SOLUTION INTRAVENOUS; SUBCUTANEOUS at 08:05

## 2020-02-07 RX ADMIN — LEVOTHYROXINE SODIUM SCH MCG: 100 TABLET ORAL at 07:57

## 2020-02-07 RX ADMIN — SODIUM CHLORIDE SCH MG: 9 INJECTION, SOLUTION INTRAVENOUS at 08:09

## 2020-02-07 RX ADMIN — MUPIROCIN SCH APPLIC: 20 OINTMENT TOPICAL at 08:10

## 2020-02-07 RX ADMIN — PREGABALIN SCH MG: 25 CAPSULE ORAL at 16:04

## 2020-02-07 RX ADMIN — Medication SCH MG: at 16:04

## 2020-02-07 RX ADMIN — MEMANTINE HYDROCHLORIDE SCH MG: 5 TABLET ORAL at 21:28

## 2020-02-07 RX ADMIN — Medication SCH EACH: at 21:26

## 2020-02-07 RX ADMIN — Medication SCH MG: at 08:08

## 2020-02-07 RX ADMIN — DOCUSATE SODIUM SCH MG: 100 CAPSULE, LIQUID FILLED ORAL at 18:30

## 2020-02-07 RX ADMIN — Medication PRN EACH: at 10:00

## 2020-02-07 RX ADMIN — FERROUS SULFATE TAB 325 MG (65 MG ELEMENTAL FE) SCH MG: 325 (65 FE) TAB at 08:08

## 2020-02-07 RX ADMIN — Medication SCH MG: at 04:17

## 2020-02-07 RX ADMIN — ALLOPURINOL SCH MG: 100 TABLET ORAL at 08:08

## 2020-02-07 RX ADMIN — INSULIN GLARGINE SCH UNITS: 100 INJECTION, SOLUTION SUBCUTANEOUS at 21:40

## 2020-02-07 RX ADMIN — AMLODIPINE BESYLATE SCH MG: 5 TABLET ORAL at 08:09

## 2020-02-07 RX ADMIN — Medication SCH EACH: at 06:32

## 2020-02-07 RX ADMIN — INSULIN LISPRO SCH UNIT: 100 INJECTION, SOLUTION INTRAVENOUS; SUBCUTANEOUS at 12:27

## 2020-02-07 RX ADMIN — MEMANTINE HYDROCHLORIDE SCH MG: 5 TABLET ORAL at 08:09

## 2020-02-07 RX ADMIN — INSULIN HUMAN PRN UNIT: 100 INJECTION, SOLUTION PARENTERAL at 06:53

## 2020-02-07 RX ADMIN — PREGABALIN SCH MG: 25 CAPSULE ORAL at 08:08

## 2020-02-07 NOTE — NUR
RN CLOSING NOTES



NO SIGNIFICANT CHANGES IN Pt's CONDITION. NO S/S OF ACUTE DISTRESS OR SOB NOTED DURING THE 
NIGHT. ALL NEEDS MET AND ATTENDED TO. SAFETY MEASURES IN PLACE. ENDORSED TO DAYSHIFT RN FOR 
Pt's AFSHAN.

## 2020-02-07 NOTE — NUR
M/S RN NOTES



PATIENT AWAKE IN BED, NO RESPIRATORY DISTRESS, NO C/O PAIN AT THIS TIME. SKIN WARM TO TOUCH, 
IV ON THE LAC #24G, INTACT AND PATENT, LR INFUSING AT 75ML/HR. PATIENT'S DRESSING ON THE 
LEFT HIP C/D/I. PATIENT'S NEEDS ATTENDED, BED ON LOWEST LOCKED POSITION, CALL LIGHT WITHIN 
REACH. WILL CONTINUE TO MONITOR.

## 2020-02-07 NOTE — NUR
M/S RN NOTES



PATIENT DISCHARGED IN STABLE CONDITION, VSS, NO RESPIRATORY DISTRESS, PAIN TOLERABLE AT 
LEVEL 4/10 WITH PAIN MEDICATION AS PRESCRIBED. FAMILY AT BEDSIDE, GIVEN DISCHARGE 
INSTRUCTIONS TO FAMILY AND PATIENT,VERBALIZED UNDERSTANDING. SKIN WARM TO TOUCH, IV ACCESS 
SITE LEAKING, REMOVED AND APPLIED PRESSURE DRESSING. PATIENT'S SKIN ASSESSED, CLEANED 
SURGICAL SITE WITH IODINE, DRESSING CHANGED, COVERED WITH NEW DRESSING AND TAPE. MD AWARE OF 
DRESSING CHANGE. PHOTOS TAKEN AND PUT IN CHART. PATIENT HAS NO BELONGINGS, BELONGINGS LIST 
SIGNED. PATIENT'S NEEDS ATTENDED. REPORT GIVEN TO PARAMEDIC. PATIENT LEFT WITH PARAMEDICS 
VIA GURNEY. REPORT GIVEN TO KHLOE AT [facility].

## 2020-02-07 NOTE — NUR
VSS 98.0-62-17  /42.  SATS 100% ON O2 @ 2LITERS NC.  PATIENT NEWLY ADMITTED APPROX. 3 
HOURS AGO.  COLOR SLIGHTLY PALE BUT FAIR.  SKIN WARM, DRY, & INTACT.  NO BEDSORES OR 
BREAKDOWNS.  ABDOMEN SOFTLY DISTENDED WITH +BOWEL SOUNDS.  NO MAYER FOR INC OF BOWEL AND 
BLADDER.  1+ ANKLE EDEMAS WITH ALL PERIPHERAL PULSES+.  NO SIGNS OF ACUTE 
CARDIAC/RESPIRATORY DISTRESS OR SUPPRESSION.  NO OBVIOUS SIGNS OF PAIN.

## 2020-02-07 NOTE — NUR
Patient received from [facility]. Report was received from OTTO Lewis. Pt is 
oriented to self only. Pt is cooperative. Pt is on MRSA isolation. The family is present at 
the bed side. Pt is bed bound, unable to move in bed independently. Skin assessment is done, 
pt has left hip incisions under dressing. Pt amputations of left foot toes. history obtained 
from the family.

## 2020-02-08 VITALS — DIASTOLIC BLOOD PRESSURE: 51 MMHG | SYSTOLIC BLOOD PRESSURE: 128 MMHG

## 2020-02-08 VITALS — SYSTOLIC BLOOD PRESSURE: 115 MMHG | DIASTOLIC BLOOD PRESSURE: 42 MMHG

## 2020-02-08 VITALS — SYSTOLIC BLOOD PRESSURE: 116 MMHG | DIASTOLIC BLOOD PRESSURE: 48 MMHG

## 2020-02-08 VITALS — DIASTOLIC BLOOD PRESSURE: 56 MMHG | SYSTOLIC BLOOD PRESSURE: 135 MMHG

## 2020-02-08 RX ADMIN — INSULIN GLARGINE SCH UNITS: 100 INJECTION, SOLUTION SUBCUTANEOUS at 20:59

## 2020-02-08 RX ADMIN — Medication SCH EACH: at 12:44

## 2020-02-08 RX ADMIN — DOCUSATE SODIUM SCH MG: 100 CAPSULE, LIQUID FILLED ORAL at 16:43

## 2020-02-08 RX ADMIN — ALLOPURINOL SCH MG: 100 TABLET ORAL at 09:05

## 2020-02-08 RX ADMIN — MEMANTINE HYDROCHLORIDE SCH MG: 5 TABLET ORAL at 09:05

## 2020-02-08 RX ADMIN — PREGABALIN SCH MG: 25 CAPSULE ORAL at 09:05

## 2020-02-08 RX ADMIN — SODIUM CHLORIDE PRN UNIT: 9 INJECTION, SOLUTION INTRAVENOUS at 12:47

## 2020-02-08 RX ADMIN — SODIUM CHLORIDE SCH MLS/HR: 9 INJECTION, SOLUTION INTRAVENOUS at 00:10

## 2020-02-08 RX ADMIN — PANTOPRAZOLE SODIUM SCH MG: 40 TABLET, DELAYED RELEASE ORAL at 06:56

## 2020-02-08 RX ADMIN — Medication SCH EACH: at 07:05

## 2020-02-08 RX ADMIN — SODIUM CHLORIDE SCH MLS/HR: 9 INJECTION, SOLUTION INTRAVENOUS at 12:40

## 2020-02-08 RX ADMIN — DONEPEZIL HYDROCHLORIDE SCH MG: 10 TABLET, FILM COATED ORAL at 09:04

## 2020-02-08 RX ADMIN — AMLODIPINE BESYLATE SCH MG: 5 TABLET ORAL at 09:06

## 2020-02-08 RX ADMIN — LEVOTHYROXINE SODIUM SCH MCG: 100 TABLET ORAL at 06:56

## 2020-02-08 RX ADMIN — SODIUM CHLORIDE SCH MLS/HR: 9 INJECTION, SOLUTION INTRAVENOUS at 23:07

## 2020-02-08 RX ADMIN — ENOXAPARIN SODIUM SCH MG: 30 INJECTION SUBCUTANEOUS at 21:02

## 2020-02-08 RX ADMIN — MUPIROCIN SCH GM: 20 OINTMENT TOPICAL at 20:48

## 2020-02-08 RX ADMIN — Medication SCH EACH: at 20:55

## 2020-02-08 RX ADMIN — DOCUSATE SODIUM SCH MG: 100 CAPSULE, LIQUID FILLED ORAL at 09:05

## 2020-02-08 RX ADMIN — Medication SCH EACH: at 16:38

## 2020-02-08 RX ADMIN — MEMANTINE HYDROCHLORIDE SCH MG: 5 TABLET ORAL at 20:48

## 2020-02-08 RX ADMIN — SODIUM CHLORIDE PRN UNIT: 9 INJECTION, SOLUTION INTRAVENOUS at 16:41

## 2020-02-08 NOTE — NUR
NURSE PLACED #22 ANGIO TO RIGHT HAND.  ACCUCHECK AT BEDTIME=173 MG/DL--COVERED WITH 3 UNITS 
OF REGULAR INSULIN & ROUTINE LANTUS 8 UNITS.  NURSE SCANNED BLADDER RANDOMLY AND FOUND 365 
CC IN BLADDER.  WILL WATCH PATIENTS OUTPUT

## 2020-02-08 NOTE — NUR
NSG: RECEIVED PT'S  LYING ON BED. 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 
URINE. CONTINUE ON MEROPENEM IV FOR ESBL URINE. NO ADVERSE REACTION NOTED.  NO HEMATURIA , 
BUT NOTED URINE WITH FOUL SMELL.  BS CHECKED AND LANTUS SUB-Q GIVEN AS ORDERED.BLOOD SUGAR 
111MG/DL. PATIENT TURNED AND REPOSITIONED .KEPT CLEAN AND DRY.

## 2020-02-08 NOTE — NUR
PT ON BED WITH EYES CLOSED, EASILY AROUSABLE WHEN AWAKEN, NOT IN RESPIRATORY DISTRESS, 
RECEIVED WITH 02 AT 2L/NC FOR COMFORT BREATHING.Hungarian SPEAKING ONLY WHEN SPOKEN TO. NO C/P 
PAIN WHEN ASKED, WITH HEPLOCK ON THE RIGHT HAND WITH GAUGE 22, NO SS OF INFILTRATION NOTED 
ON THE AREA.

## 2020-02-08 NOTE — NUR
Received an order from Lucia Hawthorne NP to continue Bactroban ointment apply to nostrils 
Q12 hrs x 5 days for MRSA of nares.

## 2020-02-08 NOTE — NUR
PT'S  ON CONTACT ISOLATION FOR MRSA OF BOTH NARES AND ESBL OF URINE. CONTINUE ON MEROPENEM 
IV FOR ESBL URINE. NO ADVERSE REACTION NOTED. AFEBRILE. NO HEMATURIA , BUT NOTED URINE WITH 
FOUL SMELL.  BS CHECKED NOSS OF HYPERGLYCEMIA NOTED. PT DOESN'T CALL FOR HELP, NEEDS 
ANTICIPATED. TURNED AND REPOSITIONED .KEPT CLEAN AND DRY.

## 2020-02-09 VITALS — DIASTOLIC BLOOD PRESSURE: 45 MMHG | SYSTOLIC BLOOD PRESSURE: 117 MMHG

## 2020-02-09 VITALS — DIASTOLIC BLOOD PRESSURE: 38 MMHG | SYSTOLIC BLOOD PRESSURE: 102 MMHG

## 2020-02-09 VITALS — SYSTOLIC BLOOD PRESSURE: 147 MMHG | DIASTOLIC BLOOD PRESSURE: 56 MMHG

## 2020-02-09 VITALS — DIASTOLIC BLOOD PRESSURE: 52 MMHG | SYSTOLIC BLOOD PRESSURE: 135 MMHG

## 2020-02-09 VITALS — DIASTOLIC BLOOD PRESSURE: 54 MMHG | SYSTOLIC BLOOD PRESSURE: 140 MMHG

## 2020-02-09 RX ADMIN — LEVOTHYROXINE SODIUM SCH MCG: 100 TABLET ORAL at 06:07

## 2020-02-09 RX ADMIN — AMLODIPINE BESYLATE SCH MG: 5 TABLET ORAL at 08:42

## 2020-02-09 RX ADMIN — Medication SCH EACH: at 11:39

## 2020-02-09 RX ADMIN — SODIUM CHLORIDE PRN UNIT: 9 INJECTION, SOLUTION INTRAVENOUS at 21:43

## 2020-02-09 RX ADMIN — DOCUSATE SODIUM SCH MG: 100 CAPSULE, LIQUID FILLED ORAL at 16:27

## 2020-02-09 RX ADMIN — HYDROCODONE BITARTRATE AND ACETAMINOPHEN PRN TAB: 5; 325 TABLET ORAL at 12:21

## 2020-02-09 RX ADMIN — INSULIN GLARGINE SCH UNITS: 100 INJECTION, SOLUTION SUBCUTANEOUS at 21:44

## 2020-02-09 RX ADMIN — DOCUSATE SODIUM SCH MG: 100 CAPSULE, LIQUID FILLED ORAL at 08:26

## 2020-02-09 RX ADMIN — HYDROCODONE BITARTRATE AND ACETAMINOPHEN PRN TAB: 5; 325 TABLET ORAL at 11:13

## 2020-02-09 RX ADMIN — Medication SCH EACH: at 16:41

## 2020-02-09 RX ADMIN — HYDROCODONE BITARTRATE AND ACETAMINOPHEN PRN TAB: 5; 325 TABLET ORAL at 06:29

## 2020-02-09 RX ADMIN — MUPIROCIN SCH GM: 20 OINTMENT TOPICAL at 21:31

## 2020-02-09 RX ADMIN — MUPIROCIN SCH GM: 20 OINTMENT TOPICAL at 08:22

## 2020-02-09 RX ADMIN — MEMANTINE HYDROCHLORIDE SCH MG: 5 TABLET ORAL at 08:23

## 2020-02-09 RX ADMIN — MEMANTINE HYDROCHLORIDE SCH MG: 5 TABLET ORAL at 21:31

## 2020-02-09 RX ADMIN — Medication SCH EACH: at 21:38

## 2020-02-09 RX ADMIN — SODIUM CHLORIDE SCH MLS/HR: 9 INJECTION, SOLUTION INTRAVENOUS at 11:33

## 2020-02-09 RX ADMIN — SODIUM CHLORIDE PRN UNIT: 9 INJECTION, SOLUTION INTRAVENOUS at 16:34

## 2020-02-09 RX ADMIN — AMLODIPINE BESYLATE SCH MG: 5 TABLET ORAL at 08:26

## 2020-02-09 RX ADMIN — Medication SCH EACH: at 06:49

## 2020-02-09 RX ADMIN — SODIUM CHLORIDE PRN UNIT: 9 INJECTION, SOLUTION INTRAVENOUS at 12:16

## 2020-02-09 RX ADMIN — PANTOPRAZOLE SODIUM SCH MG: 40 TABLET, DELAYED RELEASE ORAL at 06:08

## 2020-02-09 RX ADMIN — DONEPEZIL HYDROCHLORIDE SCH MG: 10 TABLET, FILM COATED ORAL at 08:26

## 2020-02-09 RX ADMIN — HYDROCODONE BITARTRATE AND ACETAMINOPHEN PRN TAB: 5; 325 TABLET ORAL at 19:42

## 2020-02-09 RX ADMIN — ENOXAPARIN SODIUM SCH MG: 30 INJECTION SUBCUTANEOUS at 21:44

## 2020-02-09 RX ADMIN — SODIUM CHLORIDE SCH MLS/HR: 9 INJECTION, SOLUTION INTRAVENOUS at 23:35

## 2020-02-09 RX ADMIN — PREGABALIN SCH MG: 25 CAPSULE ORAL at 08:23

## 2020-02-09 RX ADMIN — ALLOPURINOL SCH MG: 100 TABLET ORAL at 08:23

## 2020-02-09 NOTE — NUR
NSG: PT REMAIN CALM AND COOPERATIVE, C/O PAIN @ 0630 AM. NORCO 5/325 MG 1 TAB PO GIVEN FOR 
PAIN.  NO RESPIRATORY DISTRESS, ON 02 @ 2L/NC FOR COMFORT BREATHING.Kazakh SPEAKING ONLY 
WHEN SPOKEN TO.  HEPLOCK ON THE RIGHT HAND WITH GAUGE 22, NO SS OF INFILTRATION NOTED ON THE 
AREA. RESTING IN BED COMFORTABLY.

## 2020-02-10 VITALS — DIASTOLIC BLOOD PRESSURE: 54 MMHG | SYSTOLIC BLOOD PRESSURE: 137 MMHG

## 2020-02-10 VITALS — DIASTOLIC BLOOD PRESSURE: 47 MMHG | SYSTOLIC BLOOD PRESSURE: 129 MMHG

## 2020-02-10 VITALS — SYSTOLIC BLOOD PRESSURE: 131 MMHG | DIASTOLIC BLOOD PRESSURE: 53 MMHG

## 2020-02-10 VITALS — DIASTOLIC BLOOD PRESSURE: 44 MMHG | SYSTOLIC BLOOD PRESSURE: 122 MMHG

## 2020-02-10 LAB
BASOPHILS # BLD AUTO: 0 K/UL (ref 0–8)
BASOPHILS NFR BLD AUTO: 0.2 % (ref 0–2)
BUN SERPL-MCNC: 58 MG/DL (ref 7–18)
CHLORIDE SERPL-SCNC: 108 MMOL/L (ref 98–107)
CO2 SERPL-SCNC: 25 MMOL/L (ref 21–32)
CREAT SERPL-MCNC: 2.3 MG/DL (ref 0.6–1.3)
EOSINOPHIL # BLD AUTO: 0.2 K/UL (ref 0–0.7)
EOSINOPHIL NFR BLD AUTO: 2.2 % (ref 0–7)
GLUCOSE SERPL-MCNC: 84 MG/DL (ref 74–106)
HCT VFR BLD AUTO: 27.6 % (ref 31.2–41.9)
HGB BLD-MCNC: 9.1 G/DL (ref 10.9–14.3)
LYMPHOCYTES # BLD AUTO: 1.6 K/UL (ref 20–40)
LYMPHOCYTES NFR BLD AUTO: 19 % (ref 20.5–51.5)
MAGNESIUM SERPL-MCNC: 2.1 MG/DL (ref 1.8–2.4)
MCH RBC QN AUTO: 30.2 UUG (ref 24.7–32.8)
MCHC RBC AUTO-ENTMCNC: 33 G/DL (ref 32.3–35.6)
MCV RBC AUTO: 91.8 FL (ref 75.5–95.3)
MONOCYTES # BLD AUTO: 0.9 K/UL (ref 2–10)
MONOCYTES NFR BLD AUTO: 10.6 % (ref 0–11)
NEUTROPHILS # BLD AUTO: 5.7 K/UL (ref 1.8–8.9)
NEUTROPHILS NFR BLD AUTO: 68 % (ref 38.5–71.5)
PHOSPHATE SERPL-MCNC: 4.1 MG/DL (ref 2.5–4.9)
PLATELET # BLD AUTO: 173 K/UL (ref 179–408)
POTASSIUM SERPL-SCNC: 5 MMOL/L (ref 3.5–5.1)
RBC # BLD AUTO: 3.01 MIL/UL (ref 3.63–4.92)
WBC # BLD AUTO: 8.3 K/UL (ref 3.8–11.8)

## 2020-02-10 RX ADMIN — PANTOPRAZOLE SODIUM SCH MG: 40 TABLET, DELAYED RELEASE ORAL at 06:25

## 2020-02-10 RX ADMIN — ENOXAPARIN SODIUM SCH MG: 30 INJECTION SUBCUTANEOUS at 21:08

## 2020-02-10 RX ADMIN — Medication SCH EACH: at 16:46

## 2020-02-10 RX ADMIN — SODIUM CHLORIDE SCH MLS/HR: 9 INJECTION, SOLUTION INTRAVENOUS at 11:53

## 2020-02-10 RX ADMIN — SODIUM CHLORIDE PRN UNIT: 9 INJECTION, SOLUTION INTRAVENOUS at 21:07

## 2020-02-10 RX ADMIN — PREGABALIN SCH MG: 25 CAPSULE ORAL at 09:29

## 2020-02-10 RX ADMIN — INSULIN GLARGINE SCH UNITS: 100 INJECTION, SOLUTION SUBCUTANEOUS at 21:07

## 2020-02-10 RX ADMIN — MEMANTINE HYDROCHLORIDE SCH MG: 5 TABLET ORAL at 20:57

## 2020-02-10 RX ADMIN — ALLOPURINOL SCH MG: 100 TABLET ORAL at 09:29

## 2020-02-10 RX ADMIN — Medication SCH EACH: at 11:16

## 2020-02-10 RX ADMIN — MEMANTINE HYDROCHLORIDE SCH MG: 5 TABLET ORAL at 09:29

## 2020-02-10 RX ADMIN — ACETAMINOPHEN SCH MG: 325 TABLET ORAL at 21:00

## 2020-02-10 RX ADMIN — DONEPEZIL HYDROCHLORIDE SCH MG: 10 TABLET, FILM COATED ORAL at 09:29

## 2020-02-10 RX ADMIN — LEVOTHYROXINE SODIUM SCH MCG: 100 TABLET ORAL at 06:24

## 2020-02-10 RX ADMIN — SODIUM CHLORIDE PRN UNIT: 9 INJECTION, SOLUTION INTRAVENOUS at 11:52

## 2020-02-10 RX ADMIN — DOCUSATE SODIUM SCH MG: 100 CAPSULE, LIQUID FILLED ORAL at 17:26

## 2020-02-10 RX ADMIN — Medication SCH EACH: at 06:37

## 2020-02-10 RX ADMIN — ERGOCALCIFEROL SCH UNIT: 1.25 CAPSULE ORAL at 09:29

## 2020-02-10 RX ADMIN — MUPIROCIN SCH APP: 20 OINTMENT TOPICAL at 09:30

## 2020-02-10 RX ADMIN — MUPIROCIN SCH APP: 20 OINTMENT TOPICAL at 20:57

## 2020-02-10 RX ADMIN — DOCUSATE SODIUM SCH MG: 100 CAPSULE, LIQUID FILLED ORAL at 09:29

## 2020-02-10 RX ADMIN — HYDROCODONE BITARTRATE AND ACETAMINOPHEN PRN TAB: 5; 325 TABLET ORAL at 10:57

## 2020-02-10 RX ADMIN — Medication SCH EACH: at 21:06

## 2020-02-10 RX ADMIN — AMLODIPINE BESYLATE SCH MG: 5 TABLET ORAL at 09:30

## 2020-02-10 RX ADMIN — HYDROCODONE BITARTRATE AND ACETAMINOPHEN PRN TAB: 5; 325 TABLET ORAL at 01:43

## 2020-02-10 NOTE — NUR
Patient sleeping at this time, easily arousable, Puerto Rican speaking mostly. No acute distress 
noted. Pt. on O2 NC at 1LPM with O2 saturation of 96%. NO acute distress noted. Vital signs 
taken and stable for patient. Patient on IV ATB therapy of Merrem 500mg q 12hrs for ESBL of 
urine with NO adverse reaction noted. All other meds administered and tolerated well. All 
other needs attended, family at bed side, safety measures in place, call light left at bed 
side, endorsed to next shift and will continue with care.

## 2020-02-10 NOTE — NUR
No acute events overnight, pt having intermittent sleep with periods of being awake. No 
fever, no nausea and no vomiting. pt is well managed with current pain regimen

## 2020-02-11 VITALS — DIASTOLIC BLOOD PRESSURE: 43 MMHG | SYSTOLIC BLOOD PRESSURE: 122 MMHG

## 2020-02-11 VITALS — DIASTOLIC BLOOD PRESSURE: 55 MMHG | SYSTOLIC BLOOD PRESSURE: 138 MMHG

## 2020-02-11 VITALS — SYSTOLIC BLOOD PRESSURE: 125 MMHG | DIASTOLIC BLOOD PRESSURE: 59 MMHG

## 2020-02-11 VITALS — SYSTOLIC BLOOD PRESSURE: 154 MMHG | DIASTOLIC BLOOD PRESSURE: 60 MMHG

## 2020-02-11 RX ADMIN — AMLODIPINE BESYLATE SCH MG: 5 TABLET ORAL at 08:33

## 2020-02-11 RX ADMIN — Medication SCH EACH: at 21:19

## 2020-02-11 RX ADMIN — DONEPEZIL HYDROCHLORIDE SCH MG: 10 TABLET, FILM COATED ORAL at 08:32

## 2020-02-11 RX ADMIN — DOCUSATE SODIUM SCH MG: 100 CAPSULE, LIQUID FILLED ORAL at 08:33

## 2020-02-11 RX ADMIN — LEVOTHYROXINE SODIUM SCH MCG: 100 TABLET ORAL at 06:31

## 2020-02-11 RX ADMIN — ACETAMINOPHEN SCH MG: 325 TABLET ORAL at 06:31

## 2020-02-11 RX ADMIN — MEMANTINE HYDROCHLORIDE SCH MG: 5 TABLET ORAL at 21:18

## 2020-02-11 RX ADMIN — Medication SCH EACH: at 11:19

## 2020-02-11 RX ADMIN — SODIUM CHLORIDE SCH MLS/HR: 9 INJECTION, SOLUTION INTRAVENOUS at 11:19

## 2020-02-11 RX ADMIN — ENOXAPARIN SODIUM SCH MG: 30 INJECTION SUBCUTANEOUS at 21:21

## 2020-02-11 RX ADMIN — ACETAMINOPHEN SCH MG: 325 TABLET ORAL at 21:18

## 2020-02-11 RX ADMIN — Medication SCH EACH: at 16:18

## 2020-02-11 RX ADMIN — PANTOPRAZOLE SODIUM SCH MG: 40 TABLET, DELAYED RELEASE ORAL at 06:31

## 2020-02-11 RX ADMIN — Medication SCH EACH: at 06:55

## 2020-02-11 RX ADMIN — ALLOPURINOL SCH MG: 100 TABLET ORAL at 08:32

## 2020-02-11 RX ADMIN — PREGABALIN SCH MG: 25 CAPSULE ORAL at 08:33

## 2020-02-11 RX ADMIN — SODIUM CHLORIDE SCH MLS/HR: 9 INJECTION, SOLUTION INTRAVENOUS at 00:17

## 2020-02-11 RX ADMIN — INSULIN GLARGINE SCH UNITS: 100 INJECTION, SOLUTION SUBCUTANEOUS at 21:22

## 2020-02-11 RX ADMIN — MEMANTINE HYDROCHLORIDE SCH MG: 5 TABLET ORAL at 08:32

## 2020-02-11 RX ADMIN — SODIUM CHLORIDE PRN UNIT: 9 INJECTION, SOLUTION INTRAVENOUS at 16:20

## 2020-02-11 RX ADMIN — DOCUSATE SODIUM SCH MG: 100 CAPSULE, LIQUID FILLED ORAL at 16:21

## 2020-02-11 RX ADMIN — ACETAMINOPHEN SCH MG: 325 TABLET ORAL at 15:05

## 2020-02-11 RX ADMIN — SODIUM CHLORIDE PRN UNIT: 9 INJECTION, SOLUTION INTRAVENOUS at 21:22

## 2020-02-11 RX ADMIN — MUPIROCIN SCH APP: 20 OINTMENT TOPICAL at 08:33

## 2020-02-11 RX ADMIN — MUPIROCIN SCH APP: 20 OINTMENT TOPICAL at 21:18

## 2020-02-11 NOTE — NUR
No acute events overnight, pt able to sleep during shift. pt being turned as needed to 
protect the skin. Tylenol given earlier. No sign of  distress

## 2020-02-11 NOTE — NUR
Pt responds to voice and tactile stimuli through murmuring, but keeps eyes closed and 
appears very drowsy. Unable to give PO pills at this time, due to patient's condition. No 
signs of pain or discomfort noted. Will continue to monitor. VS remains stable, within 
baseline parameters.

## 2020-02-11 NOTE — NUR
Received patient sleeping in bed. not in distress. Continue oxygen at 2 LPM via nasal 
cannula. Patient keeps on sleeping but responsive. MD Lawrence notified. Patient is on lyrica 
75mg daily at 9am. MD Lawrence ordered hold for the meantime. Patient is on Tylenol 650mg on 
routine basis. Continue therapy for increase strenght and endurance. Continue pain 
management. Continue blood sugar monitoring with sliding scale protocol. no signs of 
hypo/hyperglycemia. will continue monitor

## 2020-02-12 VITALS — SYSTOLIC BLOOD PRESSURE: 143 MMHG | DIASTOLIC BLOOD PRESSURE: 48 MMHG

## 2020-02-12 VITALS — DIASTOLIC BLOOD PRESSURE: 54 MMHG | SYSTOLIC BLOOD PRESSURE: 105 MMHG

## 2020-02-12 VITALS — SYSTOLIC BLOOD PRESSURE: 120 MMHG | DIASTOLIC BLOOD PRESSURE: 57 MMHG

## 2020-02-12 VITALS — DIASTOLIC BLOOD PRESSURE: 55 MMHG | SYSTOLIC BLOOD PRESSURE: 142 MMHG

## 2020-02-12 LAB
ALP SERPL-CCNC: 173 U/L (ref 50–136)
ALT SERPL W/O P-5'-P-CCNC: 13 U/L (ref 14–59)
AST SERPL-CCNC: 21 U/L (ref 15–37)
BASOPHILS # BLD AUTO: 0 K/UL (ref 0–8)
BASOPHILS NFR BLD AUTO: 0.7 % (ref 0–2)
BILIRUB SERPL-MCNC: 0.4 MG/DL (ref 0.2–1)
BUN SERPL-MCNC: 53 MG/DL (ref 7–18)
CHLORIDE SERPL-SCNC: 111 MMOL/L (ref 98–107)
CHOLEST SERPL-MCNC: 104 MG/DL (ref ?–200)
CO2 SERPL-SCNC: 26 MMOL/L (ref 21–32)
CREAT SERPL-MCNC: 2.1 MG/DL (ref 0.6–1.3)
EOSINOPHIL # BLD AUTO: 0.1 K/UL (ref 0–0.7)
EOSINOPHIL NFR BLD AUTO: 2.1 % (ref 0–7)
GLUCOSE SERPL-MCNC: 64 MG/DL (ref 74–106)
HCT VFR BLD AUTO: 29.7 % (ref 31.2–41.9)
HDLC SERPL-MCNC: 41 MG/DL (ref 40–60)
HGB BLD-MCNC: 9.7 G/DL (ref 10.9–14.3)
LYMPHOCYTES # BLD AUTO: 1.4 K/UL (ref 20–40)
LYMPHOCYTES NFR BLD AUTO: 21.7 % (ref 20.5–51.5)
MAGNESIUM SERPL-MCNC: 2.2 MG/DL (ref 1.8–2.4)
MCH RBC QN AUTO: 30.1 UUG (ref 24.7–32.8)
MCHC RBC AUTO-ENTMCNC: 33 G/DL (ref 32.3–35.6)
MCV RBC AUTO: 91.9 FL (ref 75.5–95.3)
MONOCYTES # BLD AUTO: 0.9 K/UL (ref 2–10)
MONOCYTES NFR BLD AUTO: 13.2 % (ref 0–11)
NEUTROPHILS # BLD AUTO: 4 K/UL (ref 1.8–8.9)
NEUTROPHILS NFR BLD AUTO: 62.3 % (ref 38.5–71.5)
PHOSPHATE SERPL-MCNC: 3.9 MG/DL (ref 2.5–4.9)
PLATELET # BLD AUTO: 229 K/UL (ref 179–408)
POTASSIUM SERPL-SCNC: 5.4 MMOL/L (ref 3.5–5.1)
RBC # BLD AUTO: 3.23 MIL/UL (ref 3.63–4.92)
TRIGL SERPL-MCNC: 89 MG/DL (ref 30–150)
TSH SERPL DL<=0.005 MIU/L-ACNC: 0.63 MIU/ML (ref 0.36–3.74)
WBC # BLD AUTO: 6.5 K/UL (ref 3.8–11.8)
WS STN SPEC: 6.2 G/DL (ref 6.4–8.2)

## 2020-02-12 RX ADMIN — ANORECTAL OINTMENT SCH GM: 15.7; .44; 24; 20.6 OINTMENT TOPICAL at 20:52

## 2020-02-12 RX ADMIN — MEMANTINE HYDROCHLORIDE SCH MG: 5 TABLET ORAL at 20:04

## 2020-02-12 RX ADMIN — INSULIN GLARGINE SCH UNITS: 100 INJECTION, SOLUTION SUBCUTANEOUS at 20:50

## 2020-02-12 RX ADMIN — ALLOPURINOL SCH MG: 100 TABLET ORAL at 09:17

## 2020-02-12 RX ADMIN — SODIUM CHLORIDE SCH MLS/HR: 9 INJECTION, SOLUTION INTRAVENOUS at 00:30

## 2020-02-12 RX ADMIN — MUPIROCIN SCH APP: 20 OINTMENT TOPICAL at 20:06

## 2020-02-12 RX ADMIN — SODIUM CHLORIDE PRN UNIT: 9 INJECTION, SOLUTION INTRAVENOUS at 20:51

## 2020-02-12 RX ADMIN — Medication SCH EACH: at 20:52

## 2020-02-12 RX ADMIN — SODIUM CHLORIDE PRN UNIT: 9 INJECTION, SOLUTION INTRAVENOUS at 17:30

## 2020-02-12 RX ADMIN — SODIUM CHLORIDE SCH MLS/HR: 9 INJECTION, SOLUTION INTRAVENOUS at 23:39

## 2020-02-12 RX ADMIN — MEMANTINE HYDROCHLORIDE SCH MG: 5 TABLET ORAL at 09:17

## 2020-02-12 RX ADMIN — SODIUM CHLORIDE SCH MLS/HR: 9 INJECTION, SOLUTION INTRAVENOUS at 12:06

## 2020-02-12 RX ADMIN — PANTOPRAZOLE SODIUM SCH MG: 40 TABLET, DELAYED RELEASE ORAL at 06:44

## 2020-02-12 RX ADMIN — Medication SCH EACH: at 16:55

## 2020-02-12 RX ADMIN — ENOXAPARIN SODIUM SCH MG: 30 INJECTION SUBCUTANEOUS at 20:02

## 2020-02-12 RX ADMIN — MUPIROCIN SCH APP: 20 OINTMENT TOPICAL at 09:17

## 2020-02-12 RX ADMIN — DONEPEZIL HYDROCHLORIDE SCH MG: 10 TABLET, FILM COATED ORAL at 09:17

## 2020-02-12 RX ADMIN — ACETAMINOPHEN SCH MG: 325 TABLET ORAL at 22:22

## 2020-02-12 RX ADMIN — AMLODIPINE BESYLATE SCH MG: 5 TABLET ORAL at 09:16

## 2020-02-12 RX ADMIN — ACETAMINOPHEN SCH MG: 325 TABLET ORAL at 14:12

## 2020-02-12 RX ADMIN — ACETAMINOPHEN SCH MG: 325 TABLET ORAL at 06:44

## 2020-02-12 RX ADMIN — LEVOTHYROXINE SODIUM SCH MCG: 100 TABLET ORAL at 06:44

## 2020-02-12 RX ADMIN — Medication SCH EACH: at 07:34

## 2020-02-12 RX ADMIN — Medication SCH EACH: at 12:00

## 2020-02-12 RX ADMIN — HYDROCODONE BITARTRATE AND ACETAMINOPHEN PRN TAB: 5; 325 TABLET ORAL at 20:05

## 2020-02-12 NOTE — NUR
Patient is AAO x2, able to express self in [place] at times. No acute distress. patient was 
very active throughout shift and awake for most of the activities; Patient was up on w/c to 
eat lunch during shift. Denies any pain at this time. Pt. on ATB therapy of Merrem 500mg IV 
Q 12 hrs. No adverse reaction noted. Pt. on PT/OT therapy. Needs attended on anticipation; 
Safety measures in place, call light left at bed side and will continue with care.

## 2020-02-12 NOTE — NUR
Able to give AM meds at around 0640, tolerated well. However, initial BS check: 56 at 0650. 
Gave apple sauce and orange juice, rechecked at 0710: still 56. Rechecked at this time, BS: 
93. AM nurse made aware, encouraged to eat good breakfast intake. Pt still alert and 
verbally responsive. VS stable.

## 2020-02-12 NOTE — NUR
PATIENT IS IN BED, ALERT AND VERBALLY RESPONSIVE. AFEBRILE. NO RESPIRATORY DISTRESS. NO 
COMPLAINTS AT THIS TIME. PATIENT WITH FAMILY AT BEDSIDE. HEAD OF BED ELEVATED AT 30 DEGREES, 
WITH ONGOING O2 AT 2L/MIN VIA NC. BRITTANIE WELL. RESPIRATION EVEN AND LABORED. WILL CONTINUE TO 
MONITOR PATIENT. ALL NEEDS ATTENDED.

## 2020-02-12 NOTE — NUR
Patient is more awake, and alert now. Verbally responsive and able to follow directions. 
Diaper changed. With large BM, soft consistency, yellowish in color. L hip original dressing 
falling off, photo of wound taken and reinforced original dressing. Good perineal care 
provided. Turned every 2 hours, will continue to monitor and give AM medications if fully 
awake.

## 2020-02-13 VITALS — DIASTOLIC BLOOD PRESSURE: 54 MMHG | SYSTOLIC BLOOD PRESSURE: 139 MMHG

## 2020-02-13 VITALS — DIASTOLIC BLOOD PRESSURE: 58 MMHG | SYSTOLIC BLOOD PRESSURE: 147 MMHG

## 2020-02-13 VITALS — SYSTOLIC BLOOD PRESSURE: 139 MMHG | DIASTOLIC BLOOD PRESSURE: 53 MMHG

## 2020-02-13 VITALS — DIASTOLIC BLOOD PRESSURE: 60 MMHG | SYSTOLIC BLOOD PRESSURE: 150 MMHG

## 2020-02-13 LAB
ALP SERPL-CCNC: 167 U/L (ref 50–136)
ALT SERPL W/O P-5'-P-CCNC: 16 U/L (ref 14–59)
AST SERPL-CCNC: 20 U/L (ref 15–37)
BASOPHILS # BLD AUTO: 0.1 K/UL (ref 0–8)
BASOPHILS NFR BLD AUTO: 0.9 % (ref 0–2)
BILIRUB SERPL-MCNC: 0.4 MG/DL (ref 0.2–1)
BUN SERPL-MCNC: 48 MG/DL (ref 7–18)
CHLORIDE SERPL-SCNC: 111 MMOL/L (ref 98–107)
CO2 SERPL-SCNC: 26 MMOL/L (ref 21–32)
CREAT SERPL-MCNC: 2.1 MG/DL (ref 0.6–1.3)
EOSINOPHIL # BLD AUTO: 0.1 K/UL (ref 0–0.7)
EOSINOPHIL NFR BLD AUTO: 1.8 % (ref 0–7)
FERRITIN SERPL-MCNC: 290 NG/ML (ref 8–252)
GLUCOSE SERPL-MCNC: 96 MG/DL (ref 74–106)
HCT VFR BLD AUTO: 28.4 % (ref 31.2–41.9)
HGB BLD-MCNC: 9.1 G/DL (ref 10.9–14.3)
IRON SERPL-MCNC: 20 UG/DL (ref 50–175)
LYMPHOCYTES # BLD AUTO: 1.3 K/UL (ref 20–40)
LYMPHOCYTES NFR BLD AUTO: 21.4 % (ref 20.5–51.5)
MAGNESIUM SERPL-MCNC: 1.9 MG/DL (ref 1.8–2.4)
MCH RBC QN AUTO: 29.7 UUG (ref 24.7–32.8)
MCHC RBC AUTO-ENTMCNC: 32 G/DL (ref 32.3–35.6)
MCV RBC AUTO: 92.2 FL (ref 75.5–95.3)
MONOCYTES # BLD AUTO: 0.8 K/UL (ref 2–10)
MONOCYTES NFR BLD AUTO: 12.4 % (ref 0–11)
NEUTROPHILS # BLD AUTO: 4 K/UL (ref 1.8–8.9)
NEUTROPHILS NFR BLD AUTO: 63.5 % (ref 38.5–71.5)
PHOSPHATE SERPL-MCNC: 4.3 MG/DL (ref 2.5–4.9)
PLATELET # BLD AUTO: 229 K/UL (ref 179–408)
POTASSIUM SERPL-SCNC: 5.7 MMOL/L (ref 3.5–5.1)
RBC # BLD AUTO: 3.08 MIL/UL (ref 3.63–4.92)
WBC # BLD AUTO: 6.3 K/UL (ref 3.8–11.8)
WS STN SPEC: 6.1 G/DL (ref 6.4–8.2)

## 2020-02-13 RX ADMIN — SODIUM CHLORIDE PRN UNIT: 9 INJECTION, SOLUTION INTRAVENOUS at 17:09

## 2020-02-13 RX ADMIN — Medication SCH EACH: at 12:10

## 2020-02-13 RX ADMIN — ALLOPURINOL SCH MG: 100 TABLET ORAL at 08:15

## 2020-02-13 RX ADMIN — ENOXAPARIN SODIUM SCH MG: 30 INJECTION SUBCUTANEOUS at 22:00

## 2020-02-13 RX ADMIN — PANTOPRAZOLE SODIUM SCH MG: 40 TABLET, DELAYED RELEASE ORAL at 06:28

## 2020-02-13 RX ADMIN — ACETAMINOPHEN SCH MG: 325 TABLET ORAL at 06:28

## 2020-02-13 RX ADMIN — SODIUM CHLORIDE PRN UNIT: 9 INJECTION, SOLUTION INTRAVENOUS at 12:15

## 2020-02-13 RX ADMIN — ANORECTAL OINTMENT SCH GM: 15.7; .44; 24; 20.6 OINTMENT TOPICAL at 21:58

## 2020-02-13 RX ADMIN — SODIUM CHLORIDE SCH MLS/HR: 9 INJECTION, SOLUTION INTRAVENOUS at 12:16

## 2020-02-13 RX ADMIN — Medication SCH EACH: at 06:49

## 2020-02-13 RX ADMIN — LEVOTHYROXINE SODIUM SCH MCG: 100 TABLET ORAL at 06:28

## 2020-02-13 RX ADMIN — INSULIN GLARGINE SCH UNITS: 100 INJECTION, SOLUTION SUBCUTANEOUS at 22:06

## 2020-02-13 RX ADMIN — AMLODIPINE BESYLATE SCH MG: 5 TABLET ORAL at 08:16

## 2020-02-13 RX ADMIN — ACETAMINOPHEN SCH MG: 325 TABLET ORAL at 13:38

## 2020-02-13 RX ADMIN — HYDROCODONE BITARTRATE AND ACETAMINOPHEN PRN TAB: 5; 325 TABLET ORAL at 08:17

## 2020-02-13 RX ADMIN — MEMANTINE HYDROCHLORIDE SCH MG: 5 TABLET ORAL at 21:49

## 2020-02-13 RX ADMIN — ANORECTAL OINTMENT SCH GM: 15.7; .44; 24; 20.6 OINTMENT TOPICAL at 08:17

## 2020-02-13 RX ADMIN — ACETAMINOPHEN SCH MG: 325 TABLET ORAL at 21:50

## 2020-02-13 RX ADMIN — DONEPEZIL HYDROCHLORIDE SCH MG: 10 TABLET, FILM COATED ORAL at 08:16

## 2020-02-13 RX ADMIN — Medication SCH EACH: at 21:00

## 2020-02-13 RX ADMIN — MEMANTINE HYDROCHLORIDE SCH MG: 5 TABLET ORAL at 08:16

## 2020-02-13 RX ADMIN — Medication SCH EACH: at 16:44

## 2020-02-13 RX ADMIN — MUPIROCIN SCH APP: 20 OINTMENT TOPICAL at 08:17

## 2020-02-13 NOTE — NUR
Patient is AAO x 2, able to express needs in Georgian. Vital signs stable. On continuos IV 
ATB therapy of Merrem 500mg IV Q12hrs. All due meds administered as ordered and scheduled. 
Norco 5/325mg 1 tab administered for pain and effective. Patient noted with abnormal labs 
and informed Dr. Lawrence. NO new change of condition noted during shift. Needs attended and 
met, safety measures in place and will continue with care.

## 2020-02-13 NOTE — NUR
PATIENT IN BED, ALERT AND VERBALLY RESPONSIVE. AFEBRILE. NO RESPIRATORY DISTRESS. PATIENT 
WITH EPISODE OF MOANING UPON MOVEMENT DURING ADLS, BUT PAIN RELIEVED ONCE DONE. RECEIVED DUE 
MEDICATIONS. TOLERATED WELL. CONTINUES TO BE ON O2 AT 2L/MIN VIA NC. RESPIRATION EVEN AND 
UNLABORED. NO SOB NOTED. IN NO RESPIRATORY OR ACUTE DISTRESS. KEPT PATIENT WARM, DRY, AND 
COMFORTABLE. LEFT PATIENT WITH BED WHEELS LOCKED, CALL LIGHT WITHIN REACH. BED AT LOW 
POSITION.

## 2020-02-14 VITALS — SYSTOLIC BLOOD PRESSURE: 124 MMHG | DIASTOLIC BLOOD PRESSURE: 46 MMHG

## 2020-02-14 VITALS — SYSTOLIC BLOOD PRESSURE: 150 MMHG | DIASTOLIC BLOOD PRESSURE: 62 MMHG

## 2020-02-14 VITALS — DIASTOLIC BLOOD PRESSURE: 62 MMHG | SYSTOLIC BLOOD PRESSURE: 159 MMHG

## 2020-02-14 VITALS — SYSTOLIC BLOOD PRESSURE: 144 MMHG | DIASTOLIC BLOOD PRESSURE: 51 MMHG

## 2020-02-14 LAB
BASOPHILS # BLD AUTO: 0 K/UL (ref 0–8)
BASOPHILS NFR BLD AUTO: 0.6 % (ref 0–2)
BUN SERPL-MCNC: 43 MG/DL (ref 7–18)
CHLORIDE SERPL-SCNC: 113 MMOL/L (ref 98–107)
CO2 SERPL-SCNC: 26 MMOL/L (ref 21–32)
CREAT SERPL-MCNC: 1.9 MG/DL (ref 0.6–1.3)
EOSINOPHIL # BLD AUTO: 0.2 K/UL (ref 0–0.7)
EOSINOPHIL NFR BLD AUTO: 3.5 % (ref 0–7)
GLUCOSE SERPL-MCNC: 83 MG/DL (ref 74–106)
HCT VFR BLD AUTO: 28.5 % (ref 31.2–41.9)
HEMOCCULT STL QL: NEGATIVE
HGB BLD-MCNC: 9.3 G/DL (ref 10.9–14.3)
LYMPHOCYTES # BLD AUTO: 1.5 K/UL (ref 20–40)
LYMPHOCYTES NFR BLD AUTO: 24.9 % (ref 20.5–51.5)
MAGNESIUM SERPL-MCNC: 2 MG/DL (ref 1.8–2.4)
MCH RBC QN AUTO: 29.9 UUG (ref 24.7–32.8)
MCHC RBC AUTO-ENTMCNC: 32 G/DL (ref 32.3–35.6)
MCV RBC AUTO: 92.3 FL (ref 75.5–95.3)
MONOCYTES # BLD AUTO: 0.6 K/UL (ref 2–10)
MONOCYTES NFR BLD AUTO: 9.6 % (ref 0–11)
NEUTROPHILS # BLD AUTO: 3.7 K/UL (ref 1.8–8.9)
NEUTROPHILS NFR BLD AUTO: 61.4 % (ref 38.5–71.5)
PHOSPHATE SERPL-MCNC: 4 MG/DL (ref 2.5–4.9)
PLATELET # BLD AUTO: 244 K/UL (ref 179–408)
POTASSIUM SERPL-SCNC: 4.9 MMOL/L (ref 3.5–5.1)
RBC # BLD AUTO: 3.09 MIL/UL (ref 3.63–4.92)
WBC # BLD AUTO: 6.1 K/UL (ref 3.8–11.8)

## 2020-02-14 RX ADMIN — ACETAMINOPHEN SCH MG: 325 TABLET ORAL at 06:00

## 2020-02-14 RX ADMIN — MEMANTINE HYDROCHLORIDE SCH MG: 5 TABLET ORAL at 20:07

## 2020-02-14 RX ADMIN — ALLOPURINOL SCH MG: 100 TABLET ORAL at 09:23

## 2020-02-14 RX ADMIN — PANTOPRAZOLE SODIUM SCH MG: 40 TABLET, DELAYED RELEASE ORAL at 06:42

## 2020-02-14 RX ADMIN — Medication SCH EACH: at 16:59

## 2020-02-14 RX ADMIN — HYDROCODONE BITARTRATE AND ACETAMINOPHEN PRN TAB: 5; 325 TABLET ORAL at 06:42

## 2020-02-14 RX ADMIN — SODIUM CHLORIDE SCH MLS/HR: 9 INJECTION, SOLUTION INTRAVENOUS at 23:33

## 2020-02-14 RX ADMIN — LEVOTHYROXINE SODIUM SCH MCG: 100 TABLET ORAL at 06:42

## 2020-02-14 RX ADMIN — SODIUM CHLORIDE SCH MLS/HR: 9 INJECTION, SOLUTION INTRAVENOUS at 11:54

## 2020-02-14 RX ADMIN — MEMANTINE HYDROCHLORIDE SCH MG: 5 TABLET ORAL at 09:37

## 2020-02-14 RX ADMIN — Medication SCH EACH: at 07:41

## 2020-02-14 RX ADMIN — Medication SCH EACH: at 12:30

## 2020-02-14 RX ADMIN — ACETAMINOPHEN SCH MG: 325 TABLET ORAL at 14:56

## 2020-02-14 RX ADMIN — HYDROCODONE BITARTRATE AND ACETAMINOPHEN PRN TAB: 5; 325 TABLET ORAL at 18:20

## 2020-02-14 RX ADMIN — ENOXAPARIN SODIUM SCH MG: 30 INJECTION SUBCUTANEOUS at 20:19

## 2020-02-14 RX ADMIN — ACETAMINOPHEN SCH MG: 325 TABLET ORAL at 22:18

## 2020-02-14 RX ADMIN — Medication SCH EACH: at 20:13

## 2020-02-14 RX ADMIN — ANORECTAL OINTMENT SCH GM: 15.7; .44; 24; 20.6 OINTMENT TOPICAL at 20:20

## 2020-02-14 RX ADMIN — Medication SCH MG: at 09:30

## 2020-02-14 RX ADMIN — SODIUM CHLORIDE SCH MLS/HR: 9 INJECTION, SOLUTION INTRAVENOUS at 00:11

## 2020-02-14 RX ADMIN — HYDROCODONE BITARTRATE AND ACETAMINOPHEN PRN TAB: 5; 325 TABLET ORAL at 01:18

## 2020-02-14 RX ADMIN — INSULIN GLARGINE SCH UNITS: 100 INJECTION, SOLUTION SUBCUTANEOUS at 20:18

## 2020-02-14 RX ADMIN — AMLODIPINE BESYLATE SCH MG: 5 TABLET ORAL at 09:23

## 2020-02-14 RX ADMIN — SODIUM CHLORIDE PRN UNIT: 9 INJECTION, SOLUTION INTRAVENOUS at 20:15

## 2020-02-14 RX ADMIN — DONEPEZIL HYDROCHLORIDE SCH MG: 10 TABLET, FILM COATED ORAL at 09:23

## 2020-02-14 RX ADMIN — ANORECTAL OINTMENT SCH GM: 15.7; .44; 24; 20.6 OINTMENT TOPICAL at 09:25

## 2020-02-14 NOTE — NUR
Patient AAO x 2. NO acute distress. Vital signs stable. Due medications administered as 
ordered and scheduled and tolerated well. Pt. sitting up on wheel chair and with PT doing 
activities at this time will continue with care.

## 2020-02-14 NOTE — NUR
Patient is alert and oriented x 2. NO acute distress noted. Pt. placed in RA during shift 
and tolerating well with O2 saturation of 95%. Vital signs are stable for patient. Left hip 
surgical site intact and dry. Covered with dressing. Norco 5/325 mg 1 tab prn administered 
for pain and effective. . Patient with 2 person max assist during care. All due medications 
administered. on IV ATB therapy of Merrem IV Q 12hrs. All other needs attended, family here 
with Pt. at this time, safety measures in place, call light left at bed side and will 
continue with care.

## 2020-02-15 VITALS — SYSTOLIC BLOOD PRESSURE: 150 MMHG | DIASTOLIC BLOOD PRESSURE: 54 MMHG

## 2020-02-15 VITALS — SYSTOLIC BLOOD PRESSURE: 122 MMHG | DIASTOLIC BLOOD PRESSURE: 56 MMHG

## 2020-02-15 VITALS — SYSTOLIC BLOOD PRESSURE: 112 MMHG | DIASTOLIC BLOOD PRESSURE: 58 MMHG

## 2020-02-15 VITALS — SYSTOLIC BLOOD PRESSURE: 129 MMHG | DIASTOLIC BLOOD PRESSURE: 54 MMHG

## 2020-02-15 RX ADMIN — ENOXAPARIN SODIUM SCH MG: 30 INJECTION SUBCUTANEOUS at 20:23

## 2020-02-15 RX ADMIN — ANORECTAL OINTMENT SCH GM: 15.7; .44; 24; 20.6 OINTMENT TOPICAL at 20:24

## 2020-02-15 RX ADMIN — Medication SCH EACH: at 11:51

## 2020-02-15 RX ADMIN — PREGABALIN PRN MG: 25 CAPSULE ORAL at 20:26

## 2020-02-15 RX ADMIN — LEVOTHYROXINE SODIUM SCH MCG: 100 TABLET ORAL at 06:18

## 2020-02-15 RX ADMIN — MEMANTINE HYDROCHLORIDE SCH MG: 5 TABLET ORAL at 08:42

## 2020-02-15 RX ADMIN — Medication SCH MG: at 08:35

## 2020-02-15 RX ADMIN — Medication SCH EACH: at 17:26

## 2020-02-15 RX ADMIN — INSULIN GLARGINE SCH UNITS: 100 INJECTION, SOLUTION SUBCUTANEOUS at 20:23

## 2020-02-15 RX ADMIN — Medication SCH EACH: at 20:20

## 2020-02-15 RX ADMIN — ACETAMINOPHEN SCH MG: 325 TABLET ORAL at 06:18

## 2020-02-15 RX ADMIN — DONEPEZIL HYDROCHLORIDE SCH MG: 10 TABLET, FILM COATED ORAL at 08:35

## 2020-02-15 RX ADMIN — SODIUM CHLORIDE PRN UNIT: 9 INJECTION, SOLUTION INTRAVENOUS at 11:52

## 2020-02-15 RX ADMIN — ACETAMINOPHEN SCH MG: 325 TABLET ORAL at 13:44

## 2020-02-15 RX ADMIN — SODIUM CHLORIDE SCH MLS/HR: 9 INJECTION, SOLUTION INTRAVENOUS at 11:51

## 2020-02-15 RX ADMIN — ALLOPURINOL SCH MG: 100 TABLET ORAL at 08:36

## 2020-02-15 RX ADMIN — PANTOPRAZOLE SODIUM SCH MG: 40 TABLET, DELAYED RELEASE ORAL at 06:18

## 2020-02-15 RX ADMIN — MEMANTINE HYDROCHLORIDE SCH MG: 5 TABLET ORAL at 20:14

## 2020-02-15 RX ADMIN — AMLODIPINE BESYLATE SCH MG: 5 TABLET ORAL at 08:37

## 2020-02-15 RX ADMIN — HYDROCODONE BITARTRATE AND ACETAMINOPHEN PRN TAB: 5; 325 TABLET ORAL at 13:45

## 2020-02-15 RX ADMIN — ACETAMINOPHEN SCH MG: 325 TABLET ORAL at 21:26

## 2020-02-15 RX ADMIN — ANORECTAL OINTMENT SCH GM: 15.7; .44; 24; 20.6 OINTMENT TOPICAL at 09:16

## 2020-02-15 RX ADMIN — Medication SCH EACH: at 06:23

## 2020-02-15 NOTE — NUR
Patient complaints of nerve pain in both feet, MD Lackey notified with orders for Lyrica 25 
mg daily PRN

## 2020-02-15 NOTE — NUR
awake alert and oriented x2-3 with periods of confusion and disorientation

@times. Needs attended. Tolerated po meds well. No acute distress noted.

Lyrica 25 mg po given. Kept comfortable. Repositioned for comfort, Turned

to sides. Accucheck @ 2100 126 with no coverage but Lantus given. Fall 

precautions maintained. Siderails up for safety. Incontinent of bowel and

bladder. BM this shift. kept clean and dry. VSS. Will monitor patient.

## 2020-02-15 NOTE — NUR
Awake upon initial rounds. AAOx2 with periods of confusion. On 

fall precautions. Call bell within reach. Tolerated po meds well.

Compliant with care. On IV ABT given as scheduled. No ill effects

noted. Right forearm heplock intact flushed and patent. On contact

isolation for ESBL in urine. Incontinent of bowel and bladder. Kept

clean and dry. BM noted this shift. Will monitor patient. Denies any

pain nor any discomfort.

## 2020-02-15 NOTE — NUR
Patient noted resting in bed with towel over eyes, no facial cues of pain at this time, no 
signs of distress noted, call light in reach, bed locked and in lowest position, all needs 
met at this

## 2020-02-16 VITALS — DIASTOLIC BLOOD PRESSURE: 66 MMHG | SYSTOLIC BLOOD PRESSURE: 159 MMHG

## 2020-02-16 VITALS — DIASTOLIC BLOOD PRESSURE: 57 MMHG | SYSTOLIC BLOOD PRESSURE: 132 MMHG

## 2020-02-16 VITALS — SYSTOLIC BLOOD PRESSURE: 165 MMHG | DIASTOLIC BLOOD PRESSURE: 67 MMHG

## 2020-02-16 VITALS — SYSTOLIC BLOOD PRESSURE: 137 MMHG | DIASTOLIC BLOOD PRESSURE: 54 MMHG

## 2020-02-16 RX ADMIN — MEMANTINE HYDROCHLORIDE SCH MG: 5 TABLET ORAL at 20:17

## 2020-02-16 RX ADMIN — ACETAMINOPHEN SCH MG: 325 TABLET ORAL at 06:33

## 2020-02-16 RX ADMIN — HYDROCODONE BITARTRATE AND ACETAMINOPHEN PRN TAB: 5; 325 TABLET ORAL at 08:14

## 2020-02-16 RX ADMIN — PANTOPRAZOLE SODIUM SCH MG: 40 TABLET, DELAYED RELEASE ORAL at 06:34

## 2020-02-16 RX ADMIN — ACETAMINOPHEN SCH MG: 325 TABLET ORAL at 13:49

## 2020-02-16 RX ADMIN — Medication SCH EACH: at 06:37

## 2020-02-16 RX ADMIN — ENOXAPARIN SODIUM SCH MG: 30 INJECTION SUBCUTANEOUS at 20:24

## 2020-02-16 RX ADMIN — Medication SCH EACH: at 20:21

## 2020-02-16 RX ADMIN — ANORECTAL OINTMENT SCH GM: 15.7; .44; 24; 20.6 OINTMENT TOPICAL at 20:28

## 2020-02-16 RX ADMIN — MEMANTINE HYDROCHLORIDE SCH MG: 5 TABLET ORAL at 08:13

## 2020-02-16 RX ADMIN — Medication SCH EACH: at 17:01

## 2020-02-16 RX ADMIN — DONEPEZIL HYDROCHLORIDE SCH MG: 10 TABLET, FILM COATED ORAL at 08:13

## 2020-02-16 RX ADMIN — INSULIN GLARGINE SCH UNITS: 100 INJECTION, SOLUTION SUBCUTANEOUS at 20:23

## 2020-02-16 RX ADMIN — ANORECTAL OINTMENT SCH GM: 15.7; .44; 24; 20.6 OINTMENT TOPICAL at 08:15

## 2020-02-16 RX ADMIN — Medication SCH EACH: at 12:11

## 2020-02-16 RX ADMIN — AMLODIPINE BESYLATE SCH MG: 5 TABLET ORAL at 08:14

## 2020-02-16 RX ADMIN — Medication SCH MG: at 08:13

## 2020-02-16 RX ADMIN — HYDROCODONE BITARTRATE AND ACETAMINOPHEN PRN TAB: 5; 325 TABLET ORAL at 15:14

## 2020-02-16 RX ADMIN — LEVOTHYROXINE SODIUM SCH MCG: 100 TABLET ORAL at 06:34

## 2020-02-16 RX ADMIN — ACETAMINOPHEN SCH MG: 325 TABLET ORAL at 21:20

## 2020-02-16 RX ADMIN — ALLOPURINOL SCH MG: 100 TABLET ORAL at 08:13

## 2020-02-16 NOTE — NUR
Patient noted resting in bed with eyes closed, no facial cues of pain at this time, no signs 
of distress noted, call light in reach, bed locked and in lowest position, all needs met at 
this time

## 2020-02-16 NOTE — NUR
Resting in bed upon initial rounds. AAOx2-3 VSS Family in to visit. 

Needs attended. Kept comfortable. Will monitor patient. Tolerated 

po meds well. Compliant with meds and care. On Lovenox for VTE.

Fall precautions maintained. Bed alarm on. Call bell within reach.

Siderails up for safety. Bedtime care done. Incontinent of bowel and

bladder. Kept clean and dry. BM noted this shift. Contact isolation

maintained for ESBL in urine. Will monitor patient.

## 2020-02-17 VITALS — SYSTOLIC BLOOD PRESSURE: 158 MMHG | DIASTOLIC BLOOD PRESSURE: 61 MMHG

## 2020-02-17 VITALS — DIASTOLIC BLOOD PRESSURE: 59 MMHG | SYSTOLIC BLOOD PRESSURE: 153 MMHG

## 2020-02-17 VITALS — SYSTOLIC BLOOD PRESSURE: 136 MMHG | DIASTOLIC BLOOD PRESSURE: 41 MMHG

## 2020-02-17 VITALS — SYSTOLIC BLOOD PRESSURE: 146 MMHG | DIASTOLIC BLOOD PRESSURE: 52 MMHG

## 2020-02-17 LAB
ALP SERPL-CCNC: 162 U/L (ref 50–136)
ALT SERPL W/O P-5'-P-CCNC: 30 U/L (ref 14–59)
AMORPH URATE CRY URNS QL MICRO: (no result) /HPF
APPEARANCE UR: (no result)
AST SERPL-CCNC: 40 U/L (ref 15–37)
BASOPHILS # BLD AUTO: 0.2 K/UL (ref 0–8)
BASOPHILS NFR BLD AUTO: 2.8 % (ref 0–2)
BILIRUB SERPL-MCNC: 0.4 MG/DL (ref 0.2–1)
BILIRUB UR QL STRIP: NEGATIVE
BUN SERPL-MCNC: 26 MG/DL (ref 7–18)
CHLORIDE SERPL-SCNC: 111 MMOL/L (ref 98–107)
CO2 SERPL-SCNC: 21 MMOL/L (ref 21–32)
COLOR UR: YELLOW
CREAT SERPL-MCNC: 1.8 MG/DL (ref 0.6–1.3)
DEPRECATED SQUAMOUS URNS QL MICRO: (no result) /HPF
EOSINOPHIL # BLD AUTO: 0.2 K/UL (ref 0–0.7)
EOSINOPHIL NFR BLD AUTO: 2.1 % (ref 0–7)
GLUCOSE SERPL-MCNC: 106 MG/DL (ref 74–106)
GLUCOSE UR STRIP-MCNC: NEGATIVE MG/DL
HCT VFR BLD AUTO: 27.2 % (ref 31.2–41.9)
HGB BLD-MCNC: 8.9 G/DL (ref 10.9–14.3)
HGB UR QL STRIP: (no result)
KETONES UR STRIP-MCNC: NEGATIVE MG/DL
LEUKOCYTE ESTERASE UR QL STRIP: (no result)
LYMPHOCYTES # BLD AUTO: 1.3 K/UL (ref 20–40)
LYMPHOCYTES NFR BLD AUTO: 18.6 % (ref 20.5–51.5)
MAGNESIUM SERPL-MCNC: 1.6 MG/DL (ref 1.8–2.4)
MCH RBC QN AUTO: 30.2 UUG (ref 24.7–32.8)
MCHC RBC AUTO-ENTMCNC: 33 G/DL (ref 32.3–35.6)
MCV RBC AUTO: 92.4 FL (ref 75.5–95.3)
MONOCYTES # BLD AUTO: 0.5 K/UL (ref 2–10)
MONOCYTES NFR BLD AUTO: 7.1 % (ref 0–11)
MUCOUS THREADS URNS QL MICRO: (no result) /LPF
NEUTROPHILS # BLD AUTO: 5 K/UL (ref 1.8–8.9)
NEUTROPHILS NFR BLD AUTO: 69.4 % (ref 38.5–71.5)
NITRITE UR QL STRIP: NEGATIVE
PH UR STRIP: 6 [PH] (ref 5–8)
PHOSPHATE SERPL-MCNC: 3.6 MG/DL (ref 2.5–4.9)
PLATELET # BLD AUTO: 248 K/UL (ref 179–408)
POTASSIUM SERPL-SCNC: 5 MMOL/L (ref 3.5–5.1)
RBC # BLD AUTO: 2.95 MIL/UL (ref 3.63–4.92)
RBC #/AREA URNS HPF: (no result) /HPF (ref 0–3)
SP GR UR STRIP: 1.02 (ref 1–1.03)
UROBILINOGEN UR STRIP-MCNC: 0.2 E.U./DL
WBC # BLD AUTO: 7.2 K/UL (ref 3.8–11.8)
WBC #/AREA URNS HPF: (no result) /HPF
WBC #/AREA URNS HPF: (no result) /HPF (ref 0–3)
WS STN SPEC: 6 G/DL (ref 6.4–8.2)

## 2020-02-17 RX ADMIN — LEVOTHYROXINE SODIUM SCH MCG: 100 TABLET ORAL at 06:16

## 2020-02-17 RX ADMIN — INSULIN GLARGINE SCH UNITS: 100 INJECTION, SOLUTION SUBCUTANEOUS at 21:00

## 2020-02-17 RX ADMIN — ENOXAPARIN SODIUM SCH MG: 30 INJECTION SUBCUTANEOUS at 20:51

## 2020-02-17 RX ADMIN — MEMANTINE HYDROCHLORIDE SCH MG: 5 TABLET ORAL at 08:34

## 2020-02-17 RX ADMIN — MEMANTINE HYDROCHLORIDE SCH MG: 5 TABLET ORAL at 20:51

## 2020-02-17 RX ADMIN — ANORECTAL OINTMENT SCH GM: 15.7; .44; 24; 20.6 OINTMENT TOPICAL at 08:35

## 2020-02-17 RX ADMIN — ACETAMINOPHEN SCH MG: 325 TABLET ORAL at 06:14

## 2020-02-17 RX ADMIN — DONEPEZIL HYDROCHLORIDE SCH MG: 10 TABLET, FILM COATED ORAL at 08:33

## 2020-02-17 RX ADMIN — ERGOCALCIFEROL SCH UNIT: 1.25 CAPSULE ORAL at 08:34

## 2020-02-17 RX ADMIN — Medication SCH EACH: at 12:33

## 2020-02-17 RX ADMIN — PANTOPRAZOLE SODIUM SCH MG: 40 TABLET, DELAYED RELEASE ORAL at 06:14

## 2020-02-17 RX ADMIN — ANORECTAL OINTMENT SCH GM: 15.7; .44; 24; 20.6 OINTMENT TOPICAL at 20:52

## 2020-02-17 RX ADMIN — Medication SCH MG: at 08:33

## 2020-02-17 RX ADMIN — PREGABALIN PRN MG: 25 CAPSULE ORAL at 12:33

## 2020-02-17 RX ADMIN — Medication SCH EACH: at 20:53

## 2020-02-17 RX ADMIN — Medication SCH EACH: at 17:18

## 2020-02-17 RX ADMIN — AMLODIPINE BESYLATE SCH MG: 5 TABLET ORAL at 08:34

## 2020-02-17 RX ADMIN — ACETAMINOPHEN SCH MG: 325 TABLET ORAL at 14:14

## 2020-02-17 RX ADMIN — ALLOPURINOL SCH MG: 100 TABLET ORAL at 08:33

## 2020-02-17 RX ADMIN — Medication SCH EACH: at 06:33

## 2020-02-17 RX ADMIN — ACETAMINOPHEN SCH MG: 325 TABLET ORAL at 21:07

## 2020-02-17 NOTE — NUR
RECEIVED PATIENT IN BED, ALERT AND VERBALLY RESPONSIVE. Malaysian SPEAKING. CAN MAKE NEEDS 
KNOWN. AFEBRILE. NO RESPIRATORY DISTRESS. NO COMPLAINTS OF PAIN AT THIS TIME. HEAD OF BED 
ELEVATED AT 30 DEGREES. FALL AND SAFETY PRECAUTIONS OBSERVED. CALL LIGHT WITHIN REACH, BED 
WHEELS LOCKED, BED AT LOW POSITION. SIDE RAILS X 2 UP. ALL NEEDS ATTENDED. WILL CONTINUE TO 
MONITOR PATIENT.

## 2020-02-17 NOTE — NUR
URINE COLLECTED AT THIS TIME VIA IN AND OUT CATHETERIZATION. STOOL COLLECTED FOR SUSPECTED 
C.DIFF, ORDER RECEIVED FROM MD FOFANA

## 2020-02-17 NOTE — NUR
Patient noted sitting up in bed, no complaints of pain noted, patient pre medicated with 
Norco before therapy, no signs of distress noted, call light in reach, bed locked and in 
lowest position, all needs met at this time

## 2020-02-17 NOTE — NUR
PATIENT'S SON REPORTED PATIENT HAVING COMPLAINTS OF TINGLING AT BILATERAL LEGS. PATIENT WITH 
ORDER FOR LYRICA 25MG DAILY PRN AND HAS RECEIVED LYRICA EARLIER TODAY. DR RAMÍREZ MADE 
AWARE OF PATIENT'S COMPLAINT OF TINGLING WITH ORDER FOR LYRICA 25MG Q12HRS PRN. WILL 
CONTINUE TO MONITOR PATIENT.

## 2020-02-18 VITALS — DIASTOLIC BLOOD PRESSURE: 55 MMHG | SYSTOLIC BLOOD PRESSURE: 141 MMHG

## 2020-02-18 VITALS — DIASTOLIC BLOOD PRESSURE: 66 MMHG | SYSTOLIC BLOOD PRESSURE: 164 MMHG

## 2020-02-18 VITALS — DIASTOLIC BLOOD PRESSURE: 55 MMHG | SYSTOLIC BLOOD PRESSURE: 128 MMHG

## 2020-02-18 VITALS — DIASTOLIC BLOOD PRESSURE: 62 MMHG | SYSTOLIC BLOOD PRESSURE: 155 MMHG

## 2020-02-18 RX ADMIN — Medication SCH EACH: at 16:31

## 2020-02-18 RX ADMIN — ANORECTAL OINTMENT SCH GM: 15.7; .44; 24; 20.6 OINTMENT TOPICAL at 20:18

## 2020-02-18 RX ADMIN — ANORECTAL OINTMENT SCH GM: 15.7; .44; 24; 20.6 OINTMENT TOPICAL at 08:18

## 2020-02-18 RX ADMIN — LEVOTHYROXINE SODIUM SCH MCG: 100 TABLET ORAL at 06:46

## 2020-02-18 RX ADMIN — ACETAMINOPHEN SCH MG: 325 TABLET ORAL at 14:08

## 2020-02-18 RX ADMIN — HYDROCODONE BITARTRATE AND ACETAMINOPHEN PRN TAB: 5; 325 TABLET ORAL at 12:31

## 2020-02-18 RX ADMIN — ALLOPURINOL SCH MG: 100 TABLET ORAL at 08:18

## 2020-02-18 RX ADMIN — Medication SCH EACH: at 20:11

## 2020-02-18 RX ADMIN — AMLODIPINE BESYLATE SCH MG: 5 TABLET ORAL at 08:17

## 2020-02-18 RX ADMIN — ENOXAPARIN SODIUM SCH MG: 30 INJECTION SUBCUTANEOUS at 20:14

## 2020-02-18 RX ADMIN — ACETAMINOPHEN SCH MG: 325 TABLET ORAL at 21:18

## 2020-02-18 RX ADMIN — Medication SCH EACH: at 11:50

## 2020-02-18 RX ADMIN — DONEPEZIL HYDROCHLORIDE SCH MG: 10 TABLET, FILM COATED ORAL at 08:18

## 2020-02-18 RX ADMIN — PANTOPRAZOLE SODIUM SCH MG: 40 TABLET, DELAYED RELEASE ORAL at 06:46

## 2020-02-18 RX ADMIN — SODIUM CHLORIDE PRN UNIT: 9 INJECTION, SOLUTION INTRAVENOUS at 11:54

## 2020-02-18 RX ADMIN — MEMANTINE HYDROCHLORIDE SCH MG: 5 TABLET ORAL at 08:18

## 2020-02-18 RX ADMIN — ACETAMINOPHEN SCH MG: 325 TABLET ORAL at 06:46

## 2020-02-18 RX ADMIN — Medication SCH EACH: at 07:05

## 2020-02-18 RX ADMIN — Medication SCH MG: at 08:17

## 2020-02-18 RX ADMIN — MEMANTINE HYDROCHLORIDE SCH MG: 5 TABLET ORAL at 20:03

## 2020-02-18 RX ADMIN — INSULIN GLARGINE SCH UNITS: 100 INJECTION, SOLUTION SUBCUTANEOUS at 20:13

## 2020-02-18 NOTE — NUR
PATIENT CONTINUE THERAPY FOR AMBULATION, INCREASE ENDURANCE AND STRENGHT. AWAITING FOR STOOL 
SPECIMEN RESULT FOR C-DIFF. NOT IN DISTRESS. CONTINUE SKIN CARE PROVIDED. WILL CONTINUE 
MONITOR

## 2020-02-18 NOTE — NUR
PATIENT IS IN BED, ASLEEP AT THIS TIME. SLEPT THROUGH THE NIGHT. NO COMPLAINTS OF PAIN, 
DISCOMFORT OR TINGLING THROUGHOUT THE NIGHT. KEPT PATIENT WARM, DRY, AND COMFORTABLE. FALL 
AND SAFETY PRECAUTIONS OBSERVED. KEPT CALL LIGHT WITHIN REACH. ALL NEEDS ATTENDED.

## 2020-02-19 VITALS — DIASTOLIC BLOOD PRESSURE: 54 MMHG | SYSTOLIC BLOOD PRESSURE: 143 MMHG

## 2020-02-19 VITALS — DIASTOLIC BLOOD PRESSURE: 52 MMHG | SYSTOLIC BLOOD PRESSURE: 149 MMHG

## 2020-02-19 VITALS — DIASTOLIC BLOOD PRESSURE: 51 MMHG | SYSTOLIC BLOOD PRESSURE: 136 MMHG

## 2020-02-19 RX ADMIN — Medication SCH MG: at 08:45

## 2020-02-19 RX ADMIN — MEMANTINE HYDROCHLORIDE SCH MG: 5 TABLET ORAL at 20:10

## 2020-02-19 RX ADMIN — ACETAMINOPHEN SCH MG: 325 TABLET ORAL at 21:12

## 2020-02-19 RX ADMIN — AMLODIPINE BESYLATE SCH MG: 5 TABLET ORAL at 08:46

## 2020-02-19 RX ADMIN — ENOXAPARIN SODIUM SCH MG: 30 INJECTION SUBCUTANEOUS at 20:22

## 2020-02-19 RX ADMIN — PANTOPRAZOLE SODIUM SCH MG: 40 TABLET, DELAYED RELEASE ORAL at 06:16

## 2020-02-19 RX ADMIN — DONEPEZIL HYDROCHLORIDE SCH MG: 10 TABLET, FILM COATED ORAL at 08:46

## 2020-02-19 RX ADMIN — ACETAMINOPHEN SCH MG: 325 TABLET ORAL at 14:00

## 2020-02-19 RX ADMIN — LEVOTHYROXINE SODIUM SCH MCG: 100 TABLET ORAL at 06:16

## 2020-02-19 RX ADMIN — ALLOPURINOL SCH MG: 100 TABLET ORAL at 08:46

## 2020-02-19 RX ADMIN — ANORECTAL OINTMENT SCH GM: 15.7; .44; 24; 20.6 OINTMENT TOPICAL at 20:26

## 2020-02-19 RX ADMIN — ANORECTAL OINTMENT SCH GM: 15.7; .44; 24; 20.6 OINTMENT TOPICAL at 08:47

## 2020-02-19 RX ADMIN — PREGABALIN SCH MG: 25 CAPSULE ORAL at 21:12

## 2020-02-19 RX ADMIN — Medication SCH EACH: at 06:32

## 2020-02-19 RX ADMIN — Medication SCH EACH: at 16:30

## 2020-02-19 RX ADMIN — INSULIN GLARGINE SCH UNITS: 100 INJECTION, SOLUTION SUBCUTANEOUS at 20:21

## 2020-02-19 RX ADMIN — SODIUM CHLORIDE PRN UNIT: 9 INJECTION, SOLUTION INTRAVENOUS at 20:20

## 2020-02-19 RX ADMIN — ACETAMINOPHEN SCH MG: 325 TABLET ORAL at 06:15

## 2020-02-19 RX ADMIN — Medication SCH EACH: at 12:14

## 2020-02-19 RX ADMIN — HYDROCODONE BITARTRATE AND ACETAMINOPHEN PRN TAB: 5; 325 TABLET ORAL at 08:15

## 2020-02-19 RX ADMIN — Medication SCH EACH: at 20:17

## 2020-02-19 RX ADMIN — MEMANTINE HYDROCHLORIDE SCH MG: 5 TABLET ORAL at 08:45

## 2020-02-19 NOTE — NUR
Received patient in room, awake. Pt. is AAO x 2-3. IN NO acute distress or SOB noted. 
Patient S/P Cervical Laminectomy. Surgical site on lower neck with staples, dry and open to 
air.  Vital signs taken and stable.  Patient noted with severe upper and lower left sided 
weakness. F/C in place for retention; intact and patent. Pt. noted with nausea/vomiting, 
paged on call MD to reconcile meds and regarding pt.'s condition, waiting on call back. 
Needs attended, safety measures in place and will continue with care.  

-------------------------------------------------------------------------------

Addendum: 02/19/20 at 1259 by JANELL BALDWIN RN RN

-------------------------------------------------------------------------------

WRONG PATIENT CHARTING.

## 2020-02-19 NOTE — NUR
Patient came back from F/U appointment, VS stable. NO acute distress noted. Left hip 
surgical site staples removed with steri-strips and open to air and WBAT on LLE. For another 
F/U appointment with Dr. Grant in 3 month and new x-ray. NO c/o pain at this time. BS 
checked and 107mg/dl.  Family feeding patient dinner now. Discussed plan of care and patient 
condition with family. Needs attended and met. will continue with care.

## 2020-02-19 NOTE — NUR
End of shift report: AAOx3-4 vital signs stable. Quiet night. Repositioned

for comfort. Turned to sides. Incontinent of BM x3 Kept clean and dry.

Left hip dressing intact, healing well. No distress noted. No complaints

presented during shift.

## 2020-02-19 NOTE — NUR
Condition unchanged. awake alert and oriented x2-3. VSS On contact 

isolation for ESBL in urine. Incontinent of bowel and bladder. BM noted

this shift. On fall precautions. Call bell within reach. Siderails up for

safety. Tolerated po meds well. Compliant with care and meds. Needs

attended. Kept comfortable. Will monitor patient.

## 2020-02-19 NOTE — NUR
awake upon initial rounds. AAOx2-3 VSS No acute distress noted. 

Fall precautions maintained. Call bell within reach. Left hip incision

with steri strips SAIGE. No complaints presented during shift. Will

monitor patient. Tolerated po meds well. Bedtime care done. 

Incontinent of bowel and bladder. Kept clean and dry. Siderails up

for safety.

## 2020-02-20 VITALS — SYSTOLIC BLOOD PRESSURE: 143 MMHG | DIASTOLIC BLOOD PRESSURE: 47 MMHG

## 2020-02-20 VITALS — SYSTOLIC BLOOD PRESSURE: 130 MMHG | DIASTOLIC BLOOD PRESSURE: 42 MMHG

## 2020-02-20 VITALS — SYSTOLIC BLOOD PRESSURE: 160 MMHG | DIASTOLIC BLOOD PRESSURE: 56 MMHG

## 2020-02-20 VITALS — SYSTOLIC BLOOD PRESSURE: 139 MMHG | DIASTOLIC BLOOD PRESSURE: 73 MMHG

## 2020-02-20 RX ADMIN — PREGABALIN SCH MG: 25 CAPSULE ORAL at 21:25

## 2020-02-20 RX ADMIN — ENOXAPARIN SODIUM SCH MG: 30 INJECTION SUBCUTANEOUS at 21:32

## 2020-02-20 RX ADMIN — Medication SCH EACH: at 21:29

## 2020-02-20 RX ADMIN — LEVOTHYROXINE SODIUM SCH MCG: 100 TABLET ORAL at 06:21

## 2020-02-20 RX ADMIN — Medication SCH EACH: at 11:11

## 2020-02-20 RX ADMIN — HYDROCODONE BITARTRATE AND ACETAMINOPHEN PRN TAB: 5; 325 TABLET ORAL at 07:43

## 2020-02-20 RX ADMIN — Medication SCH MG: at 09:03

## 2020-02-20 RX ADMIN — DONEPEZIL HYDROCHLORIDE SCH MG: 10 TABLET, FILM COATED ORAL at 09:03

## 2020-02-20 RX ADMIN — ACETAMINOPHEN SCH MG: 325 TABLET ORAL at 06:20

## 2020-02-20 RX ADMIN — MEMANTINE HYDROCHLORIDE SCH MG: 5 TABLET ORAL at 21:25

## 2020-02-20 RX ADMIN — ALLOPURINOL SCH MG: 100 TABLET ORAL at 09:03

## 2020-02-20 RX ADMIN — SODIUM CHLORIDE PRN UNIT: 9 INJECTION, SOLUTION INTRAVENOUS at 07:47

## 2020-02-20 RX ADMIN — ACETAMINOPHEN SCH MG: 325 TABLET ORAL at 21:25

## 2020-02-20 RX ADMIN — PREGABALIN SCH MG: 25 CAPSULE ORAL at 13:20

## 2020-02-20 RX ADMIN — AMLODIPINE BESYLATE SCH MG: 5 TABLET ORAL at 09:03

## 2020-02-20 RX ADMIN — INSULIN GLARGINE SCH UNITS: 100 INJECTION, SOLUTION SUBCUTANEOUS at 21:32

## 2020-02-20 RX ADMIN — MEMANTINE HYDROCHLORIDE SCH MG: 5 TABLET ORAL at 09:03

## 2020-02-20 RX ADMIN — ANORECTAL OINTMENT SCH GM: 15.7; .44; 24; 20.6 OINTMENT TOPICAL at 09:03

## 2020-02-20 RX ADMIN — ACETAMINOPHEN SCH MG: 325 TABLET ORAL at 13:21

## 2020-02-20 RX ADMIN — Medication SCH EACH: at 16:07

## 2020-02-20 RX ADMIN — PREGABALIN SCH MG: 25 CAPSULE ORAL at 06:20

## 2020-02-20 RX ADMIN — Medication SCH EACH: at 06:32

## 2020-02-20 RX ADMIN — ANORECTAL OINTMENT SCH GM: 15.7; .44; 24; 20.6 OINTMENT TOPICAL at 21:29

## 2020-02-20 RX ADMIN — PANTOPRAZOLE SODIUM SCH MG: 40 TABLET, DELAYED RELEASE ORAL at 06:20

## 2020-02-20 NOTE — NUR
PATIENT SEEN AND EXAMINED BY MD DUBON, ORDERED D/C ISOLATION, NEGATIVE TEST FOR UA AND STOOL 
FOR C DIFF. WILL CONTINUE MONITOR

## 2020-02-20 NOTE — NUR
PATIENT CONTINUE PAIN MANAGEMENT PRIOR TO THERAPY WITH GOOD EFFECT AND ROUTINE LYRICA 25MG 
EVERY 8 HOURS. CONTINUE OXYGEN AT 2LPM VIA NASAL CANNULA. NO COMPLAINT VOICED DURING ROUNDS. 
CONTINUE SKIN CARE PROVIDED. NOT IN DISTRESS. PATIENT NEGATIVE RODS FOR URINE COLLECTION AND 
NO TOXICITY FOR STOOL. ASK MD IF NEED TO CONTINUE ISOLATION. WILL CONTINUE MONITOR

## 2020-02-21 VITALS — DIASTOLIC BLOOD PRESSURE: 51 MMHG | SYSTOLIC BLOOD PRESSURE: 135 MMHG

## 2020-02-21 VITALS — SYSTOLIC BLOOD PRESSURE: 125 MMHG | DIASTOLIC BLOOD PRESSURE: 6 MMHG

## 2020-02-21 VITALS — SYSTOLIC BLOOD PRESSURE: 110 MMHG | DIASTOLIC BLOOD PRESSURE: 64 MMHG

## 2020-02-21 VITALS — DIASTOLIC BLOOD PRESSURE: 68 MMHG | SYSTOLIC BLOOD PRESSURE: 125 MMHG

## 2020-02-21 VITALS — DIASTOLIC BLOOD PRESSURE: 59 MMHG | SYSTOLIC BLOOD PRESSURE: 166 MMHG

## 2020-02-21 RX ADMIN — INSULIN GLARGINE SCH UNITS: 100 INJECTION, SOLUTION SUBCUTANEOUS at 20:48

## 2020-02-21 RX ADMIN — DONEPEZIL HYDROCHLORIDE SCH MG: 10 TABLET, FILM COATED ORAL at 09:42

## 2020-02-21 RX ADMIN — Medication SCH EACH: at 20:48

## 2020-02-21 RX ADMIN — Medication SCH MG: at 09:41

## 2020-02-21 RX ADMIN — Medication SCH EACH: at 16:53

## 2020-02-21 RX ADMIN — ACETAMINOPHEN SCH MG: 325 TABLET ORAL at 13:25

## 2020-02-21 RX ADMIN — Medication SCH EACH: at 06:52

## 2020-02-21 RX ADMIN — ALLOPURINOL SCH MG: 100 TABLET ORAL at 09:41

## 2020-02-21 RX ADMIN — AMLODIPINE BESYLATE SCH MG: 5 TABLET ORAL at 09:42

## 2020-02-21 RX ADMIN — HYDROCODONE BITARTRATE AND ACETAMINOPHEN PRN TAB: 5; 325 TABLET ORAL at 08:10

## 2020-02-21 RX ADMIN — PREGABALIN SCH MG: 25 CAPSULE ORAL at 13:25

## 2020-02-21 RX ADMIN — LEVOTHYROXINE SODIUM SCH MCG: 100 TABLET ORAL at 06:29

## 2020-02-21 RX ADMIN — ACETAMINOPHEN SCH MG: 325 TABLET ORAL at 21:01

## 2020-02-21 RX ADMIN — Medication SCH EACH: at 11:08

## 2020-02-21 RX ADMIN — ANORECTAL OINTMENT SCH GM: 15.7; .44; 24; 20.6 OINTMENT TOPICAL at 09:41

## 2020-02-21 RX ADMIN — MEMANTINE HYDROCHLORIDE SCH MG: 5 TABLET ORAL at 20:48

## 2020-02-21 RX ADMIN — ACETAMINOPHEN SCH MG: 325 TABLET ORAL at 06:29

## 2020-02-21 RX ADMIN — ANORECTAL OINTMENT SCH GM: 15.7; .44; 24; 20.6 OINTMENT TOPICAL at 20:48

## 2020-02-21 RX ADMIN — PREGABALIN SCH MG: 25 CAPSULE ORAL at 06:29

## 2020-02-21 RX ADMIN — PREGABALIN SCH MG: 25 CAPSULE ORAL at 21:01

## 2020-02-21 RX ADMIN — ENOXAPARIN SODIUM SCH MG: 30 INJECTION SUBCUTANEOUS at 20:50

## 2020-02-21 RX ADMIN — MEMANTINE HYDROCHLORIDE SCH MG: 5 TABLET ORAL at 09:42

## 2020-02-21 RX ADMIN — PANTOPRAZOLE SODIUM SCH MG: 40 TABLET, DELAYED RELEASE ORAL at 06:29

## 2020-02-21 NOTE — NUR
RECEIVED PATIENT AWAKE IN BED IN STABLE CONDITION. ON ROOM AIR WITH 96% SPO2. CONTINUE PAIN 
MANAGEMENT PRIOR TO THERAPY AND ROUTINE LYRICA 25MG EVERY 8HOURS. NOT IN DISTRESS. CONTINUE 
ACCU CHECK WITH SLIDING SCALE PROTOCOL. WILL CONTINUE MONITOR

## 2020-02-21 NOTE — NUR
No acute events overnight. pt is sating well on room air. No shortness of breath, no fever, 
no nausea and no vomiting. Pt able to sleep during shift.

## 2020-02-21 NOTE — NUR
PATIENT IS IN BED, ALERT AND VERBALLY RESPONSIVE. DENIES PAIN AT THIS TIME. NO RESPIRATORY 
OR ACUTE DISTRESS AT THIS TIME. ON ROOM AIR, RESPIRATIONS EVEN AND UNLABORED. FALL AND 
SAFETY PRECAUTIONS OBSERVED, WILL CONTINUE TO MONITOR PATIENT. ALL NEEDS ATTENDED.

## 2020-02-21 NOTE — NUR
pt hypoglycemic this AM with BS of 31, pt was asymptomatic. 8 ounces of apple juice given. 
will Reassess BS in 15 minutes, MD to be notified.

## 2020-02-22 VITALS — DIASTOLIC BLOOD PRESSURE: 60 MMHG | SYSTOLIC BLOOD PRESSURE: 155 MMHG

## 2020-02-22 VITALS — DIASTOLIC BLOOD PRESSURE: 56 MMHG | SYSTOLIC BLOOD PRESSURE: 151 MMHG

## 2020-02-22 VITALS — DIASTOLIC BLOOD PRESSURE: 50 MMHG | SYSTOLIC BLOOD PRESSURE: 149 MMHG

## 2020-02-22 VITALS — SYSTOLIC BLOOD PRESSURE: 122 MMHG | DIASTOLIC BLOOD PRESSURE: 45 MMHG

## 2020-02-22 LAB
BASOPHILS # BLD AUTO: 0.1 K/UL (ref 0–8)
BASOPHILS NFR BLD AUTO: 0.7 % (ref 0–2)
BUN SERPL-MCNC: 21 MG/DL (ref 7–18)
CHLORIDE SERPL-SCNC: 110 MMOL/L (ref 98–107)
CO2 SERPL-SCNC: 25 MMOL/L (ref 21–32)
CREAT SERPL-MCNC: 1.7 MG/DL (ref 0.6–1.3)
EOSINOPHIL # BLD AUTO: 0.1 K/UL (ref 0–0.7)
EOSINOPHIL NFR BLD AUTO: 1.4 % (ref 0–7)
GLUCOSE SERPL-MCNC: 125 MG/DL (ref 74–106)
HCT VFR BLD AUTO: 30 % (ref 31.2–41.9)
HGB BLD-MCNC: 9.6 G/DL (ref 10.9–14.3)
LYMPHOCYTES # BLD AUTO: 1.9 K/UL (ref 20–40)
LYMPHOCYTES NFR BLD AUTO: 22.1 % (ref 20.5–51.5)
MAGNESIUM SERPL-MCNC: 1.7 MG/DL (ref 1.8–2.4)
MCH RBC QN AUTO: 29.7 UUG (ref 24.7–32.8)
MCHC RBC AUTO-ENTMCNC: 32 G/DL (ref 32.3–35.6)
MCV RBC AUTO: 93.1 FL (ref 75.5–95.3)
MONOCYTES # BLD AUTO: 0.7 K/UL (ref 2–10)
MONOCYTES NFR BLD AUTO: 7.8 % (ref 0–11)
NEUTROPHILS # BLD AUTO: 5.7 K/UL (ref 1.8–8.9)
NEUTROPHILS NFR BLD AUTO: 68 % (ref 38.5–71.5)
PHOSPHATE SERPL-MCNC: 3.8 MG/DL (ref 2.5–4.9)
PLATELET # BLD AUTO: 389 K/UL (ref 179–408)
POTASSIUM SERPL-SCNC: 4.7 MMOL/L (ref 3.5–5.1)
RBC # BLD AUTO: 3.22 MIL/UL (ref 3.63–4.92)
WBC # BLD AUTO: 8.4 K/UL (ref 3.8–11.8)

## 2020-02-22 RX ADMIN — ENOXAPARIN SODIUM SCH MG: 30 INJECTION SUBCUTANEOUS at 20:57

## 2020-02-22 RX ADMIN — Medication SCH EACH: at 11:59

## 2020-02-22 RX ADMIN — Medication SCH MG: at 09:17

## 2020-02-22 RX ADMIN — INSULIN GLARGINE SCH UNITS: 100 INJECTION, SOLUTION SUBCUTANEOUS at 20:57

## 2020-02-22 RX ADMIN — HYDROCODONE BITARTRATE AND ACETAMINOPHEN PRN TAB: 5; 325 TABLET ORAL at 09:17

## 2020-02-22 RX ADMIN — SODIUM CHLORIDE PRN UNIT: 9 INJECTION, SOLUTION INTRAVENOUS at 16:49

## 2020-02-22 RX ADMIN — ACETAMINOPHEN SCH MG: 325 TABLET ORAL at 06:06

## 2020-02-22 RX ADMIN — AMLODIPINE BESYLATE SCH MG: 5 TABLET ORAL at 09:18

## 2020-02-22 RX ADMIN — LEVOTHYROXINE SODIUM SCH MCG: 100 TABLET ORAL at 06:06

## 2020-02-22 RX ADMIN — ACETAMINOPHEN SCH MG: 325 TABLET ORAL at 14:35

## 2020-02-22 RX ADMIN — PREGABALIN SCH MG: 25 CAPSULE ORAL at 06:06

## 2020-02-22 RX ADMIN — ALLOPURINOL SCH MG: 100 TABLET ORAL at 09:17

## 2020-02-22 RX ADMIN — MEMANTINE HYDROCHLORIDE SCH MG: 5 TABLET ORAL at 20:57

## 2020-02-22 RX ADMIN — DONEPEZIL HYDROCHLORIDE SCH MG: 10 TABLET, FILM COATED ORAL at 09:17

## 2020-02-22 RX ADMIN — PREGABALIN SCH MG: 25 CAPSULE ORAL at 14:35

## 2020-02-22 RX ADMIN — ANORECTAL OINTMENT SCH GM: 15.7; .44; 24; 20.6 OINTMENT TOPICAL at 09:18

## 2020-02-22 RX ADMIN — ANORECTAL OINTMENT SCH GM: 15.7; .44; 24; 20.6 OINTMENT TOPICAL at 20:58

## 2020-02-22 RX ADMIN — ACETAMINOPHEN SCH MG: 325 TABLET ORAL at 21:02

## 2020-02-22 RX ADMIN — PANTOPRAZOLE SODIUM SCH MG: 40 TABLET, DELAYED RELEASE ORAL at 06:06

## 2020-02-22 RX ADMIN — Medication SCH EACH: at 06:45

## 2020-02-22 RX ADMIN — Medication SCH EACH: at 20:58

## 2020-02-22 RX ADMIN — PREGABALIN SCH MG: 25 CAPSULE ORAL at 21:02

## 2020-02-22 RX ADMIN — Medication SCH EACH: at 16:32

## 2020-02-22 RX ADMIN — MEMANTINE HYDROCHLORIDE SCH MG: 5 TABLET ORAL at 09:17

## 2020-02-22 NOTE — NUR
PATIENT IS IN BED WITH HEAD OF BED ELEVATED AT 20 DEGREES, ALERT AND VERBALLY RESPONSIVE. 
CAN MAKE NEEDS KNOWN. SPEAKS MOSTLY Burundian. DENIES DIFFICULTY BREATHING. RESPIRATIONS EVEN 
AND UNLABORED. FALL AND SAFETY PRECAUTIONS OBSERVED. RECEIVED 1-PERSON ASSIST WITH ADLS. 
LEFT PATIENT IN BED WITH BED WHEELS LOCKED, CALL LIGHT AND PERSONAL BELONGINGS WITHIN REACH. 
BED AT LOW POSITION AND BED ALARM ON.

## 2020-02-22 NOTE — NUR
Patient is AAO x 2, NO SOB or acute distress noted. NO complains of pain at this time. Due 
medications administered and tolerated well. Patient on continuos PT/OT therapy. patient 
with one person assist. Skin kept clean and dry, Surgical site on Left hip with steri-strips 
and open to air. NO s/sx of infection noted. Needs attended and met. Safety measures in 
place, call light left at bed side and will continue with care.

## 2020-02-22 NOTE — NUR
RECEIVED PATIENT IN BED, ALERT AND VERBALLY RESPONSIVE. DENIES PAIN AT THIS TIME. AWAKE AND 
ABLE TO MAKE NEEDS KNOWN. TOLERATING ROOM AIR, NO RESPIRATORY DISTRESS. RESPIRATIONS EVEN 
AND UNLABORED. FALL AND SAFETY PRECAUTIONS OBSERVED, WILL CONTINUE TO MONITOR PATIENT. ALL 
NEEDS ATTENDED.

## 2020-02-23 VITALS — SYSTOLIC BLOOD PRESSURE: 141 MMHG | DIASTOLIC BLOOD PRESSURE: 52 MMHG

## 2020-02-23 VITALS — DIASTOLIC BLOOD PRESSURE: 56 MMHG | SYSTOLIC BLOOD PRESSURE: 122 MMHG

## 2020-02-23 VITALS — DIASTOLIC BLOOD PRESSURE: 54 MMHG | SYSTOLIC BLOOD PRESSURE: 149 MMHG

## 2020-02-23 VITALS — SYSTOLIC BLOOD PRESSURE: 128 MMHG | DIASTOLIC BLOOD PRESSURE: 41 MMHG

## 2020-02-23 RX ADMIN — Medication SCH EACH: at 06:44

## 2020-02-23 RX ADMIN — ANORECTAL OINTMENT SCH GM: 15.7; .44; 24; 20.6 OINTMENT TOPICAL at 10:05

## 2020-02-23 RX ADMIN — Medication SCH EACH: at 16:54

## 2020-02-23 RX ADMIN — ANORECTAL OINTMENT SCH GM: 15.7; .44; 24; 20.6 OINTMENT TOPICAL at 21:02

## 2020-02-23 RX ADMIN — Medication SCH EACH: at 21:01

## 2020-02-23 RX ADMIN — LEVOTHYROXINE SODIUM SCH MCG: 100 TABLET ORAL at 06:04

## 2020-02-23 RX ADMIN — ACETAMINOPHEN SCH MG: 325 TABLET ORAL at 14:00

## 2020-02-23 RX ADMIN — AMLODIPINE BESYLATE SCH MG: 5 TABLET ORAL at 10:04

## 2020-02-23 RX ADMIN — ACETAMINOPHEN SCH MG: 325 TABLET ORAL at 21:01

## 2020-02-23 RX ADMIN — PREGABALIN SCH MG: 25 CAPSULE ORAL at 13:59

## 2020-02-23 RX ADMIN — PREGABALIN SCH MG: 25 CAPSULE ORAL at 06:03

## 2020-02-23 RX ADMIN — ALLOPURINOL SCH MG: 100 TABLET ORAL at 10:04

## 2020-02-23 RX ADMIN — PANTOPRAZOLE SODIUM SCH MG: 40 TABLET, DELAYED RELEASE ORAL at 06:04

## 2020-02-23 RX ADMIN — MEMANTINE HYDROCHLORIDE SCH MG: 5 TABLET ORAL at 20:56

## 2020-02-23 RX ADMIN — Medication SCH EACH: at 11:57

## 2020-02-23 RX ADMIN — MEMANTINE HYDROCHLORIDE SCH MG: 5 TABLET ORAL at 10:02

## 2020-02-23 RX ADMIN — SODIUM CHLORIDE PRN UNIT: 9 INJECTION, SOLUTION INTRAVENOUS at 21:08

## 2020-02-23 RX ADMIN — ACETAMINOPHEN SCH MG: 325 TABLET ORAL at 06:03

## 2020-02-23 RX ADMIN — SODIUM CHLORIDE PRN UNIT: 9 INJECTION, SOLUTION INTRAVENOUS at 12:02

## 2020-02-23 RX ADMIN — INSULIN GLARGINE SCH UNITS: 100 INJECTION, SOLUTION SUBCUTANEOUS at 20:58

## 2020-02-23 RX ADMIN — Medication SCH MG: at 10:04

## 2020-02-23 RX ADMIN — PREGABALIN SCH MG: 25 CAPSULE ORAL at 21:01

## 2020-02-23 RX ADMIN — ENOXAPARIN SODIUM SCH MG: 30 INJECTION SUBCUTANEOUS at 20:57

## 2020-02-23 RX ADMIN — DONEPEZIL HYDROCHLORIDE SCH MG: 10 TABLET, FILM COATED ORAL at 10:01

## 2020-02-23 NOTE — NUR
RECEIVED PATIENT IN BED, AWAKE, ALERT AND VERBALLY RESPONSIVE. PATIENT'S SON AT BEDSIDE. 
DENIES PAIN AT THIS TIME. ABLE TO MAKE NEEDS KNOWN. NO RESPIRATORY OR ACUTE DISTRESS. 
RESPIRATIONS EVEN AND UNLABORED. FALL AND SAFETY PRECAUTIONS OBSERVED, WILL CONTINUE TO 
MONITOR PATIENT. ALL NEEDS ATTENDED.

## 2020-02-23 NOTE — NUR
PATIENT IS ASLEEP IN BED, EASILY AROUSED. ALERT AND VERBALLY RESPONSIVE. CAN MAKE NEEDS 
KNOWN. RESPIRATIONS EVEN AND UNLABORED. NO COMPLAINTS OF PAIN AT THIS TIME. FALL AND SAFETY 
PRECAUTIONS OBSERVED. RECEIVED 1-PERSON ASSIST WITH ADLS. KEPT PATIENT WARM, DRY, AND 
COMFORTABLE. LEFT PATIENT IN BED WITH BED WHEELS LOCKED, CALL LIGHT AND PERSONAL BELONGINGS 
WITHIN REACH. BED AT LOW POSITION AND BED ALARM ON.

## 2020-02-24 VITALS — SYSTOLIC BLOOD PRESSURE: 140 MMHG | DIASTOLIC BLOOD PRESSURE: 49 MMHG

## 2020-02-24 VITALS — SYSTOLIC BLOOD PRESSURE: 142 MMHG | DIASTOLIC BLOOD PRESSURE: 49 MMHG

## 2020-02-24 VITALS — SYSTOLIC BLOOD PRESSURE: 118 MMHG | DIASTOLIC BLOOD PRESSURE: 55 MMHG

## 2020-02-24 VITALS — DIASTOLIC BLOOD PRESSURE: 53 MMHG | SYSTOLIC BLOOD PRESSURE: 150 MMHG

## 2020-02-24 RX ADMIN — ACETAMINOPHEN SCH MG: 325 TABLET ORAL at 21:16

## 2020-02-24 RX ADMIN — PANTOPRAZOLE SODIUM SCH MG: 40 TABLET, DELAYED RELEASE ORAL at 06:04

## 2020-02-24 RX ADMIN — SODIUM CHLORIDE PRN UNIT: 9 INJECTION, SOLUTION INTRAVENOUS at 21:11

## 2020-02-24 RX ADMIN — DONEPEZIL HYDROCHLORIDE SCH MG: 10 TABLET, FILM COATED ORAL at 09:34

## 2020-02-24 RX ADMIN — ACETAMINOPHEN SCH MG: 325 TABLET ORAL at 13:49

## 2020-02-24 RX ADMIN — PREGABALIN SCH MG: 25 CAPSULE ORAL at 21:16

## 2020-02-24 RX ADMIN — ALLOPURINOL SCH MG: 100 TABLET ORAL at 09:35

## 2020-02-24 RX ADMIN — LEVOTHYROXINE SODIUM SCH MCG: 100 TABLET ORAL at 06:04

## 2020-02-24 RX ADMIN — MEMANTINE HYDROCHLORIDE SCH MG: 5 TABLET ORAL at 09:35

## 2020-02-24 RX ADMIN — ENOXAPARIN SODIUM SCH MG: 30 INJECTION SUBCUTANEOUS at 21:23

## 2020-02-24 RX ADMIN — HYDROCODONE BITARTRATE AND ACETAMINOPHEN PRN TAB: 5; 325 TABLET ORAL at 09:10

## 2020-02-24 RX ADMIN — Medication SCH EACH: at 21:10

## 2020-02-24 RX ADMIN — Medication SCH EACH: at 11:36

## 2020-02-24 RX ADMIN — ANORECTAL OINTMENT SCH GM: 15.7; .44; 24; 20.6 OINTMENT TOPICAL at 09:36

## 2020-02-24 RX ADMIN — PREGABALIN SCH MG: 25 CAPSULE ORAL at 13:49

## 2020-02-24 RX ADMIN — Medication SCH MG: at 09:35

## 2020-02-24 RX ADMIN — AMLODIPINE BESYLATE SCH MG: 5 TABLET ORAL at 09:35

## 2020-02-24 RX ADMIN — ACETAMINOPHEN SCH MG: 325 TABLET ORAL at 05:54

## 2020-02-24 RX ADMIN — PREGABALIN SCH MG: 25 CAPSULE ORAL at 05:54

## 2020-02-24 RX ADMIN — INSULIN GLARGINE SCH UNITS: 100 INJECTION, SOLUTION SUBCUTANEOUS at 21:00

## 2020-02-24 RX ADMIN — Medication SCH EACH: at 16:22

## 2020-02-24 RX ADMIN — ERGOCALCIFEROL SCH UNIT: 1.25 CAPSULE ORAL at 09:34

## 2020-02-24 RX ADMIN — ANORECTAL OINTMENT SCH APPLIC: 15.7; .44; 24; 20.6 OINTMENT TOPICAL at 21:11

## 2020-02-24 RX ADMIN — Medication SCH EACH: at 06:40

## 2020-02-24 RX ADMIN — MEMANTINE HYDROCHLORIDE SCH MG: 5 TABLET ORAL at 21:16

## 2020-02-24 RX ADMIN — SODIUM CHLORIDE PRN UNIT: 9 INJECTION, SOLUTION INTRAVENOUS at 18:40

## 2020-02-24 NOTE — NUR
PATIENT IS IN BED, ASLEEP BUT EASILY AROUSED. RESPIRATIONS EVEN AND UNLABORED. NO COMPLAINTS 
OF PAIN AT THIS TIME. NO FACIAL GRIMACING OBSERVED. KEPT PATIENT WARM, DRY, AND COMFORTABLE. 
FALL AND SAFETY PRECAUTIONS OBSERVED.

## 2020-02-24 NOTE — NUR
Received pt resting in bed. AAO x2-3. Family was at bedside. No acute distress noted. Denies 
pain/ discomfort. Accucheck 77, held insulin and lantus. Orange juice given. No c/o 
dizziness. Other due meds given as ordered. Safety measures maintained. Call light and 
personal items within reach. Will continue to monitor.

## 2020-02-25 VITALS — SYSTOLIC BLOOD PRESSURE: 123 MMHG | DIASTOLIC BLOOD PRESSURE: 54 MMHG

## 2020-02-25 VITALS — DIASTOLIC BLOOD PRESSURE: 57 MMHG | SYSTOLIC BLOOD PRESSURE: 169 MMHG

## 2020-02-25 LAB — 1,25(OH)2D3 SERPL-MCNC: 28.4 PG/ML (ref 19.9–79.3)

## 2020-02-25 RX ADMIN — ALLOPURINOL SCH MG: 100 TABLET ORAL at 09:28

## 2020-02-25 RX ADMIN — ACETAMINOPHEN SCH MG: 325 TABLET ORAL at 05:58

## 2020-02-25 RX ADMIN — DONEPEZIL HYDROCHLORIDE SCH MG: 10 TABLET, FILM COATED ORAL at 09:28

## 2020-02-25 RX ADMIN — Medication SCH EACH: at 06:40

## 2020-02-25 RX ADMIN — ANORECTAL OINTMENT SCH APPLIC: 15.7; .44; 24; 20.6 OINTMENT TOPICAL at 09:30

## 2020-02-25 RX ADMIN — PREGABALIN SCH MG: 25 CAPSULE ORAL at 05:58

## 2020-02-25 RX ADMIN — PANTOPRAZOLE SODIUM SCH MG: 40 TABLET, DELAYED RELEASE ORAL at 06:01

## 2020-02-25 RX ADMIN — AMLODIPINE BESYLATE SCH MG: 5 TABLET ORAL at 09:28

## 2020-02-25 RX ADMIN — Medication SCH EACH: at 12:20

## 2020-02-25 RX ADMIN — PREGABALIN SCH MG: 25 CAPSULE ORAL at 13:21

## 2020-02-25 RX ADMIN — MEMANTINE HYDROCHLORIDE SCH MG: 5 TABLET ORAL at 09:28

## 2020-02-25 RX ADMIN — HYDROCODONE BITARTRATE AND ACETAMINOPHEN PRN TAB: 5; 325 TABLET ORAL at 09:32

## 2020-02-25 RX ADMIN — LEVOTHYROXINE SODIUM SCH MCG: 100 TABLET ORAL at 06:01

## 2020-02-25 RX ADMIN — Medication SCH MG: at 09:28

## 2020-02-25 RX ADMIN — ACETAMINOPHEN SCH MG: 325 TABLET ORAL at 13:21

## 2020-02-25 NOTE — NUR
DISCHARGE NOTES: Patient is AAO x 2, able to express needs, IN NO acute distress; NO SOB 
noted. NO complains of pain at this time. On routine Tylenol 650mg PO q 8hrs. All due 
medications administered as ordered. Blood sugar checked and stable for patient. NO coverage 
needed before lunch. Daughter and grandson came to  patient. All discharge paper 
works reviewed with daughter. Patient and family medication teaching given to daughter. 
Patient/family teaching also given on Lovenox and Insulin sliding scale administration.  
Informed family to F/UP with PCP and HOME HEALTH for further care. Prescriptions faxed to 
pt's pharmacy of choice. All belongings given to family. Discharge and belonging paper 
signed by daughter. Family stated understanding of discharge teaching. Family also stated 
will F/up with PCP regrading vaccinations. Thanked for the care provided. Patient assisted 
by nurse and PT via W/C and discharged at 15:20pm.

## 2020-02-26 VITALS — DIASTOLIC BLOOD PRESSURE: 72 MMHG | SYSTOLIC BLOOD PRESSURE: 127 MMHG
